# Patient Record
Sex: FEMALE | Race: WHITE | HISPANIC OR LATINO | Employment: OTHER | URBAN - METROPOLITAN AREA
[De-identification: names, ages, dates, MRNs, and addresses within clinical notes are randomized per-mention and may not be internally consistent; named-entity substitution may affect disease eponyms.]

---

## 2018-03-30 ENCOUNTER — CONVERSION ENCOUNTER (OUTPATIENT)
Dept: GENERAL RADIOLOGY | Facility: HOSPITAL | Age: 38
End: 2018-03-30

## 2019-04-22 ENCOUNTER — HOSPITAL ENCOUNTER (OUTPATIENT)
Dept: LAB | Facility: HOSPITAL | Age: 39
Discharge: HOME | End: 2019-04-22
Attending: PHYSICIAN ASSISTANT

## 2019-04-22 LAB
T4 FREE SERPL-MCNC: 1.1 NG/DL (ref 0.9–1.8)
TSH SERPL-ACNC: 38.45 M[IU]/L (ref 0.27–4.2)

## 2019-05-29 ENCOUNTER — OFFICE VISIT CONVERTED (OUTPATIENT)
Dept: SURGERY | Facility: CLINIC | Age: 39
End: 2019-05-29
Attending: SURGERY

## 2019-06-07 ENCOUNTER — HOSPITAL ENCOUNTER (OUTPATIENT)
Dept: PERIOP | Facility: HOSPITAL | Age: 39
Setting detail: HOSPITAL OUTPATIENT SURGERY
Discharge: HOME | End: 2019-06-07
Attending: SURGERY

## 2019-06-24 ENCOUNTER — OFFICE VISIT CONVERTED (OUTPATIENT)
Dept: SURGERY | Facility: CLINIC | Age: 39
End: 2019-06-24
Attending: SURGERY

## 2019-08-12 ENCOUNTER — OFFICE VISIT CONVERTED (OUTPATIENT)
Dept: SURGERY | Facility: CLINIC | Age: 39
End: 2019-08-12
Attending: SURGERY

## 2019-08-27 ENCOUNTER — HOSPITAL ENCOUNTER (OUTPATIENT)
Dept: GASTROENTEROLOGY | Facility: HOSPITAL | Age: 39
Setting detail: HOSPITAL OUTPATIENT SURGERY
Discharge: HOME | End: 2019-08-27
Attending: SURGERY

## 2021-02-04 ENCOUNTER — HOSPITAL ENCOUNTER (OUTPATIENT)
Dept: OTHER | Facility: HOSPITAL | Age: 41
Discharge: HOME | End: 2021-02-04
Attending: NURSE PRACTITIONER

## 2021-02-04 LAB
ALBUMIN SERPL-MCNC: 4.1 G/DL (ref 3.5–5)
ALBUMIN/GLOB SERPL: 1.3 {RATIO} (ref 1.4–2.6)
ALP SERPL-CCNC: 75 U/L (ref 42–98)
ALT SERPL-CCNC: 12 U/L (ref 10–40)
ANION GAP SERPL CALC-SCNC: 15 MMOL/L (ref 8–19)
AST SERPL-CCNC: 16 U/L (ref 15–50)
BASOPHILS # BLD AUTO: 0.06 10*3/UL (ref 0–0.2)
BASOPHILS NFR BLD AUTO: 0.8 % (ref 0–3)
BILIRUB SERPL-MCNC: 0.25 MG/DL (ref 0.2–1.3)
BUN SERPL-MCNC: 9 MG/DL (ref 5–25)
BUN/CREAT SERPL: 13 {RATIO} (ref 6–20)
CALCIUM SERPL-MCNC: 8.5 MG/DL (ref 8.7–10.4)
CHLORIDE SERPL-SCNC: 105 MMOL/L (ref 99–111)
CHOLEST SERPL-MCNC: 179 MG/DL (ref 107–200)
CHOLEST/HDLC SERPL: 3.2 {RATIO} (ref 3–6)
CONV ABS IMM GRAN: 0.03 10*3/UL (ref 0–0.2)
CONV CO2: 21 MMOL/L (ref 22–32)
CONV IMMATURE GRAN: 0.4 % (ref 0–1.8)
CONV TOTAL PROTEIN: 7.3 G/DL (ref 6.3–8.2)
CREAT UR-MCNC: 0.69 MG/DL (ref 0.5–0.9)
DEPRECATED RDW RBC AUTO: 40.9 FL (ref 36.4–46.3)
EOSINOPHIL # BLD AUTO: 0.24 10*3/UL (ref 0–0.7)
EOSINOPHIL # BLD AUTO: 3.1 % (ref 0–7)
ERYTHROCYTE [DISTWIDTH] IN BLOOD BY AUTOMATED COUNT: 14.6 % (ref 11.7–14.4)
GFR SERPLBLD BASED ON 1.73 SQ M-ARVRAT: >60 ML/MIN/{1.73_M2}
GLOBULIN UR ELPH-MCNC: 3.2 G/DL (ref 2–3.5)
GLUCOSE SERPL-MCNC: 81 MG/DL (ref 65–99)
HCT VFR BLD AUTO: 33.8 % (ref 37–47)
HDLC SERPL-MCNC: 56 MG/DL (ref 40–60)
HGB BLD-MCNC: 10.3 G/DL (ref 12–16)
LDLC SERPL CALC-MCNC: 104 MG/DL (ref 70–100)
LYMPHOCYTES # BLD AUTO: 2.23 10*3/UL (ref 1–5)
LYMPHOCYTES NFR BLD AUTO: 28.7 % (ref 20–45)
MCH RBC QN AUTO: 23.6 PG (ref 27–31)
MCHC RBC AUTO-ENTMCNC: 30.5 G/DL (ref 33–37)
MCV RBC AUTO: 77.3 FL (ref 81–99)
MONOCYTES # BLD AUTO: 0.52 10*3/UL (ref 0.2–1.2)
MONOCYTES NFR BLD AUTO: 6.7 % (ref 3–10)
NEUTROPHILS # BLD AUTO: 4.7 10*3/UL (ref 2–8)
NEUTROPHILS NFR BLD AUTO: 60.3 % (ref 30–85)
NRBC CBCN: 0 % (ref 0–0.7)
OSMOLALITY SERPL CALC.SUM OF ELEC: 282 MOSM/KG (ref 273–304)
PLATELET # BLD AUTO: 339 10*3/UL (ref 130–400)
PMV BLD AUTO: 10.7 FL (ref 9.4–12.3)
POTASSIUM SERPL-SCNC: 3.8 MMOL/L (ref 3.5–5.3)
RBC # BLD AUTO: 4.37 10*6/UL (ref 4.2–5.4)
SODIUM SERPL-SCNC: 137 MMOL/L (ref 135–147)
TRIGL SERPL-MCNC: 97 MG/DL (ref 40–150)
TSH SERPL-ACNC: 3.55 M[IU]/L (ref 0.27–4.2)
VLDLC SERPL-MCNC: 19 MG/DL (ref 5–37)
WBC # BLD AUTO: 7.78 10*3/UL (ref 4.8–10.8)

## 2021-03-24 ENCOUNTER — HOSPITAL ENCOUNTER (OUTPATIENT)
Dept: LAB | Facility: HOSPITAL | Age: 41
Discharge: HOME | End: 2021-03-24
Attending: INTERNAL MEDICINE

## 2021-03-24 LAB
APPEARANCE UR: CLEAR
BILIRUB UR QL: NEGATIVE
COLOR UR: YELLOW
CONV COLLECTION SOURCE (UA): NORMAL
CONV CREATININE URINE, RANDOM: 40 MG/DL (ref 10–300)
CONV UROBILINOGEN IN URINE BY AUTOMATED TEST STRIP: 0.2 {EHRLICHU}/DL (ref 0.1–1)
GLUCOSE UR QL: NEGATIVE MG/DL
HGB UR QL STRIP: NEGATIVE
KETONES UR QL STRIP: NEGATIVE MG/DL
LEUKOCYTE ESTERASE UR QL STRIP: NEGATIVE
NITRITE UR QL STRIP: NEGATIVE
PH UR STRIP.AUTO: 6 [PH] (ref 5–8)
PROT UR QL: NEGATIVE MG/DL
SP GR UR: 1.01 (ref 1–1.03)

## 2021-03-25 LAB — PROT UR-MCNC: <4 MG/DL

## 2021-05-15 VITALS — RESPIRATION RATE: 14 BRPM | BODY MASS INDEX: 35.26 KG/M2 | WEIGHT: 199 LBS | HEIGHT: 63 IN

## 2021-05-15 VITALS — BODY MASS INDEX: 34.91 KG/M2 | HEIGHT: 63 IN | WEIGHT: 197 LBS | RESPIRATION RATE: 16 BRPM

## 2021-05-24 ENCOUNTER — HOSPITAL ENCOUNTER (OUTPATIENT)
Dept: GENERAL RADIOLOGY | Facility: HOSPITAL | Age: 41
Discharge: HOME | End: 2021-05-24
Attending: OBSTETRICS & GYNECOLOGY

## 2021-06-09 RX ORDER — LEVOTHYROXINE SODIUM 175 UG/1
175 TABLET ORAL DAILY
COMMUNITY
End: 2022-03-03 | Stop reason: SDUPTHER

## 2021-06-13 PROBLEM — N93.9 ABNORMAL UTERINE BLEEDING: Status: ACTIVE | Noted: 2021-06-13

## 2021-06-14 ENCOUNTER — ANESTHESIA EVENT (OUTPATIENT)
Dept: PERIOP | Facility: HOSPITAL | Age: 41
End: 2021-06-14

## 2021-06-14 ENCOUNTER — HOSPITAL ENCOUNTER (OUTPATIENT)
Facility: HOSPITAL | Age: 41
Setting detail: HOSPITAL OUTPATIENT SURGERY
Discharge: HOME | End: 2021-06-14
Attending: OBSTETRICS & GYNECOLOGY | Admitting: OBSTETRICS & GYNECOLOGY

## 2021-06-14 ENCOUNTER — ANESTHESIA (OUTPATIENT)
Dept: PERIOP | Facility: HOSPITAL | Age: 41
End: 2021-06-14

## 2021-06-14 VITALS
SYSTOLIC BLOOD PRESSURE: 126 MMHG | OXYGEN SATURATION: 93 % | HEIGHT: 63 IN | WEIGHT: 203.26 LBS | TEMPERATURE: 97.3 F | RESPIRATION RATE: 18 BRPM | DIASTOLIC BLOOD PRESSURE: 84 MMHG | HEART RATE: 78 BPM | BODY MASS INDEX: 36.02 KG/M2

## 2021-06-14 DIAGNOSIS — N93.9 ABNORMAL UTERINE BLEEDING: Primary | ICD-10-CM

## 2021-06-14 LAB
BASOPHILS # BLD AUTO: 0.07 10*3/MM3 (ref 0–0.2)
BASOPHILS NFR BLD AUTO: 0.9 % (ref 0–1.5)
DEPRECATED RDW RBC AUTO: 45.2 FL (ref 37–54)
EOSINOPHIL # BLD AUTO: 0.34 10*3/MM3 (ref 0–0.4)
EOSINOPHIL NFR BLD AUTO: 4.2 % (ref 0.3–6.2)
ERYTHROCYTE [DISTWIDTH] IN BLOOD BY AUTOMATED COUNT: 16 % (ref 12.3–15.4)
HCG INTACT+B SERPL-ACNC: <0.5 MIU/ML
HCT VFR BLD AUTO: 32.8 % (ref 34–46.6)
HGB BLD-MCNC: 10.3 G/DL (ref 12–15.9)
IMM GRANULOCYTES # BLD AUTO: 0.04 10*3/MM3 (ref 0–0.05)
IMM GRANULOCYTES NFR BLD AUTO: 0.5 % (ref 0–0.5)
LYMPHOCYTES # BLD AUTO: 2.77 10*3/MM3 (ref 0.7–3.1)
LYMPHOCYTES NFR BLD AUTO: 34 % (ref 19.6–45.3)
MCH RBC QN AUTO: 24.6 PG (ref 26.6–33)
MCHC RBC AUTO-ENTMCNC: 31.4 G/DL (ref 31.5–35.7)
MCV RBC AUTO: 78.5 FL (ref 79–97)
MONOCYTES # BLD AUTO: 0.8 10*3/MM3 (ref 0.1–0.9)
MONOCYTES NFR BLD AUTO: 9.8 % (ref 5–12)
NEUTROPHILS NFR BLD AUTO: 4.13 10*3/MM3 (ref 1.7–7)
NEUTROPHILS NFR BLD AUTO: 50.6 % (ref 42.7–76)
NRBC BLD AUTO-RTO: 0 /100 WBC (ref 0–0.2)
PLATELET # BLD AUTO: 334 10*3/MM3 (ref 140–450)
PMV BLD AUTO: 10 FL (ref 6–12)
RBC # BLD AUTO: 4.18 10*6/MM3 (ref 3.77–5.28)
WBC # BLD AUTO: 8.15 10*3/MM3 (ref 3.4–10.8)

## 2021-06-14 PROCEDURE — 85025 COMPLETE CBC W/AUTO DIFF WBC: CPT | Performed by: OBSTETRICS & GYNECOLOGY

## 2021-06-14 PROCEDURE — 84702 CHORIONIC GONADOTROPIN TEST: CPT | Performed by: OBSTETRICS & GYNECOLOGY

## 2021-06-14 PROCEDURE — 25010000002 KETOROLAC TROMETHAMINE PER 15 MG: Performed by: NURSE ANESTHETIST, CERTIFIED REGISTERED

## 2021-06-14 PROCEDURE — 88305 TISSUE EXAM BY PATHOLOGIST: CPT | Performed by: OBSTETRICS & GYNECOLOGY

## 2021-06-14 PROCEDURE — 25010000002 MIDAZOLAM PER 1MG: Performed by: ANESTHESIOLOGY

## 2021-06-14 PROCEDURE — 25010000002 DEXAMETHASONE PER 1 MG: Performed by: NURSE ANESTHETIST, CERTIFIED REGISTERED

## 2021-06-14 PROCEDURE — 25010000002 PROPOFOL 10 MG/ML EMULSION: Performed by: NURSE ANESTHETIST, CERTIFIED REGISTERED

## 2021-06-14 PROCEDURE — 25010000002 FENTANYL CITRATE (PF) 50 MCG/ML SOLUTION: Performed by: NURSE ANESTHETIST, CERTIFIED REGISTERED

## 2021-06-14 PROCEDURE — 25010000002 ONDANSETRON PER 1 MG: Performed by: NURSE ANESTHETIST, CERTIFIED REGISTERED

## 2021-06-14 PROCEDURE — 25010000003 CEFAZOLIN IN DEXTROSE 2-4 GM/100ML-% SOLUTION: Performed by: OBSTETRICS & GYNECOLOGY

## 2021-06-14 RX ORDER — HYDROCODONE BITARTRATE AND ACETAMINOPHEN 5; 325 MG/1; MG/1
1 TABLET ORAL EVERY 6 HOURS PRN
Qty: 10 TABLET | Refills: 0 | Status: SHIPPED | OUTPATIENT
Start: 2021-06-14 | End: 2022-03-01

## 2021-06-14 RX ORDER — IBUPROFEN 600 MG/1
600 TABLET ORAL EVERY 6 HOURS PRN
Status: DISCONTINUED | OUTPATIENT
Start: 2021-06-14 | End: 2021-06-14 | Stop reason: HOSPADM

## 2021-06-14 RX ORDER — OXYCODONE HCL 5 MG/5 ML
5 SOLUTION, ORAL ORAL EVERY 4 HOURS PRN
Status: SHIPPED | OUTPATIENT
Start: 2021-06-14 | End: 2021-06-21

## 2021-06-14 RX ORDER — OXYCODONE HCL 5 MG/5 ML
10 SOLUTION, ORAL ORAL EVERY 4 HOURS PRN
Status: SHIPPED | OUTPATIENT
Start: 2021-06-14 | End: 2021-06-21

## 2021-06-14 RX ORDER — MEPERIDINE HYDROCHLORIDE 25 MG/ML
12.5 INJECTION INTRAMUSCULAR; INTRAVENOUS; SUBCUTANEOUS
Status: DISCONTINUED | OUTPATIENT
Start: 2021-06-14 | End: 2021-06-14 | Stop reason: HOSPADM

## 2021-06-14 RX ORDER — CEFAZOLIN SODIUM 2 G/100ML
2 INJECTION, SOLUTION INTRAVENOUS ONCE
Status: COMPLETED | OUTPATIENT
Start: 2021-06-14 | End: 2021-06-14

## 2021-06-14 RX ORDER — SODIUM CHLORIDE 0.9 % (FLUSH) 0.9 %
10 SYRINGE (ML) INJECTION AS NEEDED
Status: DISCONTINUED | OUTPATIENT
Start: 2021-06-14 | End: 2021-06-14 | Stop reason: HOSPADM

## 2021-06-14 RX ORDER — ONDANSETRON 2 MG/ML
INJECTION INTRAMUSCULAR; INTRAVENOUS AS NEEDED
Status: DISCONTINUED | OUTPATIENT
Start: 2021-06-14 | End: 2021-06-14 | Stop reason: SURG

## 2021-06-14 RX ORDER — IBUPROFEN 600 MG/1
600 TABLET ORAL EVERY 6 HOURS PRN
Qty: 60 TABLET | Refills: 1 | Status: SHIPPED | OUTPATIENT
Start: 2021-06-14 | End: 2022-03-01

## 2021-06-14 RX ORDER — ONDANSETRON 2 MG/ML
4 INJECTION INTRAMUSCULAR; INTRAVENOUS ONCE AS NEEDED
Status: DISCONTINUED | OUTPATIENT
Start: 2021-06-14 | End: 2021-06-14 | Stop reason: HOSPADM

## 2021-06-14 RX ORDER — PROPOFOL 10 MG/ML
VIAL (ML) INTRAVENOUS AS NEEDED
Status: DISCONTINUED | OUTPATIENT
Start: 2021-06-14 | End: 2021-06-14 | Stop reason: SURG

## 2021-06-14 RX ORDER — MAGNESIUM HYDROXIDE 1200 MG/15ML
LIQUID ORAL AS NEEDED
Status: DISCONTINUED | OUTPATIENT
Start: 2021-06-14 | End: 2021-06-14 | Stop reason: HOSPADM

## 2021-06-14 RX ORDER — PHENYLEPHRINE HCL IN 0.9% NACL 1 MG/10 ML
SYRINGE (ML) INTRAVENOUS AS NEEDED
Status: DISCONTINUED | OUTPATIENT
Start: 2021-06-14 | End: 2021-06-14 | Stop reason: SURG

## 2021-06-14 RX ORDER — SODIUM CHLORIDE, SODIUM LACTATE, POTASSIUM CHLORIDE, CALCIUM CHLORIDE 600; 310; 30; 20 MG/100ML; MG/100ML; MG/100ML; MG/100ML
9 INJECTION, SOLUTION INTRAVENOUS CONTINUOUS PRN
Status: DISCONTINUED | OUTPATIENT
Start: 2021-06-14 | End: 2021-06-14 | Stop reason: HOSPADM

## 2021-06-14 RX ORDER — MIDAZOLAM HYDROCHLORIDE 1 MG/ML
2 INJECTION INTRAMUSCULAR; INTRAVENOUS ONCE
Status: COMPLETED | OUTPATIENT
Start: 2021-06-14 | End: 2021-06-14

## 2021-06-14 RX ORDER — OXYCODONE HYDROCHLORIDE 5 MG/1
5 TABLET ORAL
Status: DISCONTINUED | OUTPATIENT
Start: 2021-06-14 | End: 2021-06-14 | Stop reason: HOSPADM

## 2021-06-14 RX ORDER — FENTANYL CITRATE 50 UG/ML
INJECTION, SOLUTION INTRAMUSCULAR; INTRAVENOUS AS NEEDED
Status: DISCONTINUED | OUTPATIENT
Start: 2021-06-14 | End: 2021-06-14 | Stop reason: SURG

## 2021-06-14 RX ORDER — SODIUM CHLORIDE, SODIUM LACTATE, POTASSIUM CHLORIDE, CALCIUM CHLORIDE 600; 310; 30; 20 MG/100ML; MG/100ML; MG/100ML; MG/100ML
100 INJECTION, SOLUTION INTRAVENOUS CONTINUOUS
Status: DISCONTINUED | OUTPATIENT
Start: 2021-06-14 | End: 2021-06-14 | Stop reason: HOSPADM

## 2021-06-14 RX ORDER — KETOROLAC TROMETHAMINE 30 MG/ML
INJECTION, SOLUTION INTRAMUSCULAR; INTRAVENOUS AS NEEDED
Status: DISCONTINUED | OUTPATIENT
Start: 2021-06-14 | End: 2021-06-14 | Stop reason: SURG

## 2021-06-14 RX ORDER — ACETAMINOPHEN 500 MG
1000 TABLET ORAL ONCE
Status: COMPLETED | OUTPATIENT
Start: 2021-06-14 | End: 2021-06-14

## 2021-06-14 RX ORDER — SODIUM CHLORIDE 0.9 % (FLUSH) 0.9 %
3 SYRINGE (ML) INJECTION EVERY 12 HOURS SCHEDULED
Status: DISCONTINUED | OUTPATIENT
Start: 2021-06-14 | End: 2021-06-14 | Stop reason: HOSPADM

## 2021-06-14 RX ORDER — BUPIVACAINE HYDROCHLORIDE AND EPINEPHRINE 5; 5 MG/ML; UG/ML
INJECTION, SOLUTION PERINEURAL AS NEEDED
Status: DISCONTINUED | OUTPATIENT
Start: 2021-06-14 | End: 2021-06-14 | Stop reason: HOSPADM

## 2021-06-14 RX ORDER — DEXMEDETOMIDINE HYDROCHLORIDE 100 UG/ML
INJECTION, SOLUTION INTRAVENOUS AS NEEDED
Status: DISCONTINUED | OUTPATIENT
Start: 2021-06-14 | End: 2021-06-14 | Stop reason: SURG

## 2021-06-14 RX ORDER — LIDOCAINE HYDROCHLORIDE 20 MG/ML
INJECTION, SOLUTION INFILTRATION; PERINEURAL AS NEEDED
Status: DISCONTINUED | OUTPATIENT
Start: 2021-06-14 | End: 2021-06-14 | Stop reason: SURG

## 2021-06-14 RX ORDER — SODIUM CHLORIDE 0.9 % (FLUSH) 0.9 %
10 SYRINGE (ML) INJECTION EVERY 12 HOURS SCHEDULED
Status: DISCONTINUED | OUTPATIENT
Start: 2021-06-14 | End: 2021-06-14 | Stop reason: HOSPADM

## 2021-06-14 RX ORDER — DEXAMETHASONE SODIUM PHOSPHATE 4 MG/ML
INJECTION, SOLUTION INTRA-ARTICULAR; INTRALESIONAL; INTRAMUSCULAR; INTRAVENOUS; SOFT TISSUE AS NEEDED
Status: DISCONTINUED | OUTPATIENT
Start: 2021-06-14 | End: 2021-06-14 | Stop reason: SURG

## 2021-06-14 RX ADMIN — ONDANSETRON 4 MG: 2 INJECTION INTRAMUSCULAR; INTRAVENOUS at 07:34

## 2021-06-14 RX ADMIN — OXYCODONE HYDROCHLORIDE 5 MG: 5 TABLET ORAL at 08:37

## 2021-06-14 RX ADMIN — DEXAMETHASONE SODIUM PHOSPHATE 8 MG: 4 INJECTION INTRA-ARTICULAR; INTRALESIONAL; INTRAMUSCULAR; INTRAVENOUS; SOFT TISSUE at 07:33

## 2021-06-14 RX ADMIN — LIDOCAINE HYDROCHLORIDE 100 MG: 20 INJECTION, SOLUTION INFILTRATION; PERINEURAL at 07:25

## 2021-06-14 RX ADMIN — DEXMEDETOMIDINE HYDROCHLORIDE 25 MCG: 100 INJECTION, SOLUTION INTRAVENOUS at 07:25

## 2021-06-14 RX ADMIN — ACETAMINOPHEN 1000 MG: 500 TABLET ORAL at 07:07

## 2021-06-14 RX ADMIN — PROPOFOL 200 MG: 10 INJECTION, EMULSION INTRAVENOUS at 07:25

## 2021-06-14 RX ADMIN — KETOROLAC TROMETHAMINE 30 MG: 30 INJECTION, SOLUTION INTRAMUSCULAR; INTRAVENOUS at 07:54

## 2021-06-14 RX ADMIN — SODIUM CHLORIDE, POTASSIUM CHLORIDE, SODIUM LACTATE AND CALCIUM CHLORIDE: 600; 310; 30; 20 INJECTION, SOLUTION INTRAVENOUS at 08:01

## 2021-06-14 RX ADMIN — FENTANYL CITRATE 50 MCG: 50 INJECTION INTRAMUSCULAR; INTRAVENOUS at 07:28

## 2021-06-14 RX ADMIN — FENTANYL CITRATE 50 MCG: 50 INJECTION INTRAMUSCULAR; INTRAVENOUS at 07:33

## 2021-06-14 RX ADMIN — SODIUM CHLORIDE, POTASSIUM CHLORIDE, SODIUM LACTATE AND CALCIUM CHLORIDE 100 ML/HR: 600; 310; 30; 20 INJECTION, SOLUTION INTRAVENOUS at 06:51

## 2021-06-14 RX ADMIN — Medication 100 MCG: at 07:42

## 2021-06-14 RX ADMIN — CEFAZOLIN SODIUM 2 G: 2 INJECTION, SOLUTION INTRAVENOUS at 07:22

## 2021-06-14 RX ADMIN — MIDAZOLAM HYDROCHLORIDE 2 MG: 1 INJECTION, SOLUTION INTRAMUSCULAR; INTRAVENOUS at 07:07

## 2021-06-14 NOTE — H&P
[facility]   HISTORY AND PHYSICAL    Patient Name: Saray Vang  : 1980  MRN: [id number]  Primary Care Physician:  Emma, No Known  Date of admission: 2021    Subjective   Subjective     Chief Complaint: Here for surgery    HPI:    Saray Vang is a 40 y.o. female who presents for hysteroscopy, D&C and endometrial ablation for the treatment of abnormal uterine bleeding. She has heavy and frequent menses with clotting and flooding. Her menses has been refractory to medical therapy and she now desires to proceed with surgical therapy.    Review of Systems   All systems were reviewed and negative except for: Heavy mesnes. Frequent menses. Fatigue    Personal History     Past Medical History:   Diagnosis Date   • Abnormal uterine bleeding (AUB)    • Disease of thyroid gland     total thyroidectomy       Past Surgical History:   Procedure Laterality Date   • HEMORRHOIDECTOMY     • LAPAROSCOPIC TUBAL LIGATION     • TOTAL THYROIDECTOMY            section    Family History: Heart disease, HLD, HTN, DM, breast cancer, Thyroid cancer    Social History:  reports that she has never smoked. She does not have any smokeless tobacco history on file. She reports that she does not drink alcohol and does not use drugs.    Home Medications:  levothyroxine      Allergies:  No Known Allergies    Objective   Objective     Vitals:   Temp:  [98.5 °F (36.9 °C)] 98.5 °F (36.9 °C)  Heart Rate:  [81] 81  Resp:  [20] 20  BP: (116)/(78) 116/78BP 117/82 Wt 203    Physical Exam    Constitutional: Awake, alert   Eyes: PERRLA, sclerae anicteric, no conjunctival injection   HENT: NCAT, mucous membranes moist   Neck: Supple, thyroid is absent, no lymphadenopathy, trachea midline   Respiratory: Clear to auscultation bilaterally, nonlabored respirations    Cardiovascular: RRR, no murmurs, rubs, or gallops, palpable pedal pulses bilaterally   Gastrointestinal: Positive bowel sounds, soft, nontender, nondistended               Genitourinary: Normal external genitalia, vagina and cervix. Uterus 10cm anteverted, no palpable masses or adnexal masses   Musculoskeletal: No bilateral ankle edema, no clubbing or cyanosis to extremities   Psychiatric: Appropriate affect, cooperative   Neurologic: Oriented x 3, strength symmetric in all extremities, speech clear   Skin: No rashes     Result Review    Result Review:  I have personally reviewed the results from the time of this admission to 6/14/2021 07:08 EDT and agree with these findings:  []  Laboratory  []  Microbiology  [x]  Radiology  []  EKG/Telemetry   []  Cardiology/Vascular   [x]  Pathology  [x]  Old records  []  Other:      Assessment/Plan   Assessment / Plan       Active Hospital Problems:  Active Hospital Problems    Diagnosis    • **Abnormal uterine bleeding      Plan: The patient has failed medical therapy, and declines further medical therapy. She desires hysteroscopy dilation and curettage, endometrial ablation. She declines hysterectomy. The risks, benefits and alternatives of the procedure have been reviewed with the patient and she desires to proceed.      DVT prophylaxis:  No DVT prophylaxis order currently exists.    CODE STATUS:       Admission Status:  I believe this patient meets outpatient status.    Electronically signed by Praful Topete MD, 06/13/21, 8:57 PM EDT.

## 2021-06-14 NOTE — ANESTHESIA PREPROCEDURE EVALUATION
Anesthesia Evaluation     Patient summary reviewed and Nursing notes reviewed   no history of anesthetic complications:  NPO Solid Status: > 8 hours  NPO Liquid Status: > 2 hours           Airway   Mallampati: II  TM distance: >3 FB  Neck ROM: full  No difficulty expected  Dental - normal exam     Pulmonary - negative pulmonary ROS and normal exam   Cardiovascular - negative cardio ROS and normal exam  Exercise tolerance: excellent (>7 METS)        Neuro/Psych- negative ROS  GI/Hepatic/Renal/Endo - negative ROS     Musculoskeletal (-) negative ROS    Abdominal    Substance History - negative use     OB/GYN negative ob/gyn ROS         Other - negative ROS       Blood dyscrasia:     ROS/Med Hx Other: Pt hypothyroid pt takes replacement thyroid for replacement    Pt took synthroid this am with a sip of water                  Anesthesia Plan    ASA 2     general and MAC     intravenous induction     Anesthetic plan, all risks, benefits, and alternatives have been provided, discussed and informed consent has been obtained with: patient and spouse/significant other.

## 2021-06-14 NOTE — DISCHARGE INSTRUCTIONS
DISCHARGE INSTRUCTIONS  GYNECOLOGICAL  PROCEDURES      ? For your surgery you had:  ? General anesthesia (you may have a sore throat for the first 24 hours)  ?   ?   ?   ? You received a medicated patch for nausea prevention today (behind your ear). It is recommended that you remove it 24-48 hours post-operatively. It must be removed within 72 hours.  ? You may experience dizziness, drowsiness, or lightheadedness for several hours following surgery.  ? Do not stay alone today or tonight.  ? Limit your activity for 24 hours.  ? Resume your diet slowly.  Follow any special dietary instructions you may have been given by your doctor.  ? You should not drive or operate machinery, drink alcohol, or sign legally binding documents for 24 hours or while you are taking pain medication.  Last dose of pain medication was given at:  .  NOTIFY YOUR DOCTOR IF YOU EXPERIENCE ANY OF THE FOLLOWING:  ? Temperature greater than 101 degrees Fahrenheit  ? Shaking Chills  ? Redness or excessive drainage from incision  ? Nausea, vomiting and/or pain that is not controlled by prescribed medications  ? Increase in bleeding or bleeding that is excessive  ? Unable to urinate in 6 hours after surgery  ? If unable to reach your doctor, please go to the closest Emergency Room [] Remove dressing in:     [] You may resume intercourse and the use of tampons as your physician has instructed you.  [] Nothing in the vagina for 2 weeks to include intercourse, douches, or tampons.  [] Vaginal bleeding may be expected for several days with flow decreasing with time and never any heavier than a normal   period.  ? If you have an ablation, vaginal discharge is expected after bleeding stops.   ? If you have foul smelling discharge, notify your physician.  ? Medications per physician instructions as indicated on Discharge Medication Information Sheet.  You should see   for follow-up care   on   .  Phone number:      SPECIAL INSTRUCTIONS:

## 2021-06-14 NOTE — ANESTHESIA POSTPROCEDURE EVALUATION
Patient: April Taj    Procedure Summary     Date: 06/14/21 Room / Location: [facility] OR  / [facility]    Anesthesia Start: 0722 Anesthesia Stop: 0807    Procedure: DILATATION AND CURETTAGE, HYSTEROSCOPY, NOVASURE ENDOMETRIAL ABLATION (N/A Vagina) Diagnosis: (ABNORMAL UTERINE BLEEDING)    Surgeons: Praful Topete MD Provider: Andre Samayoa MD    Anesthesia Type: general, MAC ASA Status: 2          Anesthesia Type: general, MAC    Vitals  Vitals Value Taken Time   BP 94/54 06/14/21 0812   Temp     Pulse 84 06/14/21 0813   Resp     SpO2 98 % 06/14/21 0813   Vitals shown include unvalidated device data.        Post Anesthesia Care and Evaluation    Patient location during evaluation: bedside  Patient participation: complete - patient participated  Level of consciousness: awake  Pain score: 1  Pain management: adequate  Airway patency: patent  Anesthetic complications: No anesthetic complications  PONV Status: none  Cardiovascular status: acceptable and stable  Respiratory status: acceptable and room air  Hydration status: acceptable

## 2021-06-14 NOTE — OP NOTE
DILATATION AND CURETTAGE HYSTEROSCOPY NOVASURE ENDOMETRIAL ABLATION  Procedure Report    Patient Name:  Saray Vang  YOB: 1980    Date of Surgery:  6/14/2021     Pre-op Diagnosis:   ABNORMAL UTERINE BLEEDING       Post-Op Diagnosis Codes:  Same    Procedure(s):  DILATATION AND CURETTAGE, HYSTEROSCOPY, NOVASURE ENDOMETRIAL ABLATION    Staff:  Surgeon(s):  Praful Topete MD      Anesthesia: General    Estimated Blood Loss: 20 ml      Specimen:          Specimens     ID Source Type Tests Collected By Collected At Frozen?    A Endometrial Curettings Tissue · TISSUE PATHOLOGY EXAM   Praful Topete MD 6/14/21 0748     This specimen was not marked as sent.              Findings: Normal external genitalia, vagina, and cervix.  The uterine cavity was normal without submucous myoma, congenital anomaly, or endometrial polyp.  Post ablation hysteroscopy revealed good global ablation without evidence of unintended uterine injury or perforation.    Complications: None    Description of Procedure: After reviewing the informed consent, including the risks, benefits and alternatives to the procedure, the patient expressed her understanding and wished to proceed.  She was taken to the operating room with an I.V. in place and running.  She was placed on the operating table in the dorsal supine position. She was given a general anesthetic and an LMA was placed.  She was re-positioned with her arms out to the side, and her legs in candycane stirrups.  We took care in positioning both the arms and the legs, padding any potential pressure points.  The patient was prepped and draped in the usual sterile fashion.  An in and out catheterization was performed to drain the patient's bladder.  A surgical time-out was performed.  I inserted a Tinsley retractor into the posterior vagina, and a Ophir retractor into the anterior vagina. I grasped the cervix with the single tooth tenaculum.  I carried out my  paracervical block with 10 ml of 0.5% Marcaine with Epinephrine. I introduced a 5 mm 30 degree diagnostic hysteroscope, with normal saline as my distension media, through the external cervical os and into the uterine cavity without difficulty.      I surveyed the uterine cavity.  The uterine cavity appeared normal, without evidence of endometrial polyp, submucous myoma, or congenital anomaly.  The hysteroscope was removed.  The uterus was sounded to 10 cm.  Using a Hegar dilator, the cervix were estimated to be 5.5 cm.  The cavity length thus, was 4.5 cm.  The cervix was dilated to 6 mm.  I performed a systematic sharp curettage of the entire endometrium. This specimen was passed off as endometrial curettings.  I re-introduced the hysteroscope. There was a good curettage of the entire endometrium, and no evidence of any uterine injury. The hysteroscope was removed.  The NovaSure ablation device was called for.  The device array was deployed, and I had a uterine with greater than 2.5 cm.  The array was retracted back into the sleeve.  The NovaSure device was inserted through the external os, and gently to the fundus of the uterus, using a bow and arrow technique.  The array was then again deployed, and the device seated, per the 's recommendations.  The uterine width was 4.5 cm.  The power was determined by the device to be 111 W.  The cervical collar was advanced against the cervix.  The cavity assessment was carried out, and passed.  The device was enabled, and the ablation initiated.  The ablation cycle carried out for 1 minute 26 seconds.  At the end of the ablation cycle, the array was retracted back into the NovaSure device a sleeve, using the bow and arrow technique.  The hysteroscope was reintroduced, and the cavity surveyed.  There was a good global ablation, without unintended injury or uterine perforation.  The hysteroscope was removed.  I removed the tenaculum.  The tenaculum sites were  hemostatic. The other instruments were removed.  Normal saline was used as a uterine distention media, and there was no significant fluid deficit.    The patient was taken out of lithotomy position, awoken from her anesthesia and then taken to the recovery room in satisfactory condition.  All counts were correct x 2.  The patient received Kefzol as her preoperative antibiotics. The patient will be discharged home when she meets discharge criteria. She will follow-up with me in 2-3 weeks.  She was instructed to call the office for temperature than greater than 100 degrees Fahrenheit, shortness of breath or chest pain, excessive nausea or vomiting with inability to tolerate oral intake, pain that is worsening despite current pain medications, heavy vaginal bleeding, or other concerns.          Praful Topete MD     Date: 6/14/2021  Time: 08:05 EDT

## 2021-06-15 LAB
CYTO UR: NORMAL
LAB AP CASE REPORT: NORMAL
LAB AP CLINICAL INFORMATION: NORMAL
PATH REPORT.FINAL DX SPEC: NORMAL
PATH REPORT.GROSS SPEC: NORMAL

## 2022-03-01 ENCOUNTER — OFFICE VISIT (OUTPATIENT)
Dept: FAMILY MEDICINE CLINIC | Facility: CLINIC | Age: 42
End: 2022-03-01

## 2022-03-01 ENCOUNTER — LAB (OUTPATIENT)
Dept: LAB | Facility: HOSPITAL | Age: 42
End: 2022-03-01

## 2022-03-01 VITALS
DIASTOLIC BLOOD PRESSURE: 87 MMHG | BODY MASS INDEX: 37.21 KG/M2 | OXYGEN SATURATION: 96 % | SYSTOLIC BLOOD PRESSURE: 131 MMHG | HEIGHT: 63 IN | RESPIRATION RATE: 20 BRPM | TEMPERATURE: 97.1 F | WEIGHT: 210 LBS | HEART RATE: 81 BPM

## 2022-03-01 DIAGNOSIS — E78.2 MIXED HYPERLIPIDEMIA: ICD-10-CM

## 2022-03-01 DIAGNOSIS — E03.9 ACQUIRED HYPOTHYROIDISM: ICD-10-CM

## 2022-03-01 DIAGNOSIS — J30.2 SEASONAL ALLERGIES: Chronic | ICD-10-CM

## 2022-03-01 DIAGNOSIS — E66.09 CLASS 2 OBESITY DUE TO EXCESS CALORIES WITHOUT SERIOUS COMORBIDITY WITH BODY MASS INDEX (BMI) OF 37.0 TO 37.9 IN ADULT: Chronic | ICD-10-CM

## 2022-03-01 DIAGNOSIS — D64.9 ANEMIA, UNSPECIFIED TYPE: ICD-10-CM

## 2022-03-01 DIAGNOSIS — I10 PRIMARY HYPERTENSION: Chronic | ICD-10-CM

## 2022-03-01 DIAGNOSIS — Z00.00 ANNUAL PHYSICAL EXAM: Primary | ICD-10-CM

## 2022-03-01 DIAGNOSIS — Z00.00 ANNUAL PHYSICAL EXAM: ICD-10-CM

## 2022-03-01 PROBLEM — F32.9 MAJOR DEPRESSION, SINGLE EPISODE: Status: ACTIVE | Noted: 2022-03-01

## 2022-03-01 PROBLEM — K59.01 SLOW TRANSIT CONSTIPATION: Status: ACTIVE | Noted: 2022-03-01

## 2022-03-01 PROBLEM — G93.32 CHRONIC FATIGUE SYNDROME: Status: ACTIVE | Noted: 2022-03-01

## 2022-03-01 PROBLEM — D50.8 IRON DEFICIENCY ANEMIA SECONDARY TO INADEQUATE DIETARY IRON INTAKE: Chronic | Status: ACTIVE | Noted: 2022-03-01

## 2022-03-01 PROBLEM — Z81.0 FAMILY HISTORY OF INTELLECTUAL DISABILITY: Status: ACTIVE | Noted: 2022-03-01

## 2022-03-01 PROBLEM — Z82.79 FAMILY HISTORY OF NEURAL TUBE DEFECT: Chronic | Status: ACTIVE | Noted: 2022-03-01

## 2022-03-01 PROBLEM — Z81.0 FAMILY HISTORY OF INTELLECTUAL DISABILITY: Chronic | Status: ACTIVE | Noted: 2022-03-01

## 2022-03-01 PROBLEM — F32.9 MAJOR DEPRESSION, SINGLE EPISODE: Status: RESOLVED | Noted: 2022-03-01 | Resolved: 2022-03-01

## 2022-03-01 PROBLEM — E66.812 CLASS 2 OBESITY DUE TO EXCESS CALORIES WITHOUT SERIOUS COMORBIDITY WITH BODY MASS INDEX (BMI) OF 37.0 TO 37.9 IN ADULT: Chronic | Status: ACTIVE | Noted: 2022-03-01

## 2022-03-01 PROBLEM — Z82.79 FAMILY HISTORY OF NEURAL TUBE DEFECT: Status: ACTIVE | Noted: 2022-03-01

## 2022-03-01 PROBLEM — G93.32 CHRONIC FATIGUE SYNDROME: Chronic | Status: ACTIVE | Noted: 2022-03-01

## 2022-03-01 PROBLEM — D50.8 IRON DEFICIENCY ANEMIA SECONDARY TO INADEQUATE DIETARY IRON INTAKE: Status: ACTIVE | Noted: 2022-03-01

## 2022-03-01 PROBLEM — K59.01 SLOW TRANSIT CONSTIPATION: Chronic | Status: ACTIVE | Noted: 2022-03-01

## 2022-03-01 LAB
ALBUMIN SERPL-MCNC: 4.4 G/DL (ref 3.5–5.2)
ALBUMIN/GLOB SERPL: 1.3 G/DL
ALP SERPL-CCNC: 75 U/L (ref 39–117)
ALT SERPL W P-5'-P-CCNC: 29 U/L (ref 1–33)
ANION GAP SERPL CALCULATED.3IONS-SCNC: 10.7 MMOL/L (ref 5–15)
AST SERPL-CCNC: 21 U/L (ref 1–32)
BASOPHILS # BLD AUTO: 0.06 10*3/MM3 (ref 0–0.2)
BASOPHILS NFR BLD AUTO: 0.8 % (ref 0–1.5)
BILIRUB SERPL-MCNC: 0.3 MG/DL (ref 0–1.2)
BUN SERPL-MCNC: 13 MG/DL (ref 6–20)
BUN/CREAT SERPL: 13 (ref 7–25)
CALCIUM SPEC-SCNC: 9.5 MG/DL (ref 8.6–10.5)
CHLORIDE SERPL-SCNC: 104 MMOL/L (ref 98–107)
CHOLEST SERPL-MCNC: 245 MG/DL (ref 0–200)
CO2 SERPL-SCNC: 25.3 MMOL/L (ref 22–29)
CREAT SERPL-MCNC: 1 MG/DL (ref 0.57–1)
DEPRECATED RDW RBC AUTO: 40.6 FL (ref 37–54)
EGFRCR SERPLBLD CKD-EPI 2021: 73.2 ML/MIN/1.73
EOSINOPHIL # BLD AUTO: 0.37 10*3/MM3 (ref 0–0.4)
EOSINOPHIL NFR BLD AUTO: 5 % (ref 0.3–6.2)
ERYTHROCYTE [DISTWIDTH] IN BLOOD BY AUTOMATED COUNT: 12.5 % (ref 12.3–15.4)
FERRITIN SERPL-MCNC: 34.7 NG/ML (ref 13–150)
FOLATE SERPL-MCNC: 12.3 NG/ML (ref 4.78–24.2)
GLOBULIN UR ELPH-MCNC: 3.4 GM/DL
GLUCOSE SERPL-MCNC: 97 MG/DL (ref 65–99)
HCT VFR BLD AUTO: 42 % (ref 34–46.6)
HDLC SERPL-MCNC: 62 MG/DL (ref 40–60)
HGB BLD-MCNC: 14.2 G/DL (ref 12–15.9)
IMM GRANULOCYTES # BLD AUTO: 0.05 10*3/MM3 (ref 0–0.05)
IMM GRANULOCYTES NFR BLD AUTO: 0.7 % (ref 0–0.5)
IRON 24H UR-MRATE: 96 MCG/DL (ref 37–145)
IRON SATN MFR SERPL: 22 % (ref 20–50)
LDLC SERPL CALC-MCNC: 165 MG/DL (ref 0–100)
LDLC/HDLC SERPL: 2.63 {RATIO}
LYMPHOCYTES # BLD AUTO: 1.95 10*3/MM3 (ref 0.7–3.1)
LYMPHOCYTES NFR BLD AUTO: 26.2 % (ref 19.6–45.3)
MCH RBC QN AUTO: 29.9 PG (ref 26.6–33)
MCHC RBC AUTO-ENTMCNC: 33.8 G/DL (ref 31.5–35.7)
MCV RBC AUTO: 88.4 FL (ref 79–97)
MONOCYTES # BLD AUTO: 0.45 10*3/MM3 (ref 0.1–0.9)
MONOCYTES NFR BLD AUTO: 6 % (ref 5–12)
NEUTROPHILS NFR BLD AUTO: 4.57 10*3/MM3 (ref 1.7–7)
NEUTROPHILS NFR BLD AUTO: 61.3 % (ref 42.7–76)
NRBC BLD AUTO-RTO: 0 /100 WBC (ref 0–0.2)
PLATELET # BLD AUTO: 359 10*3/MM3 (ref 140–450)
PMV BLD AUTO: 10.5 FL (ref 6–12)
POTASSIUM SERPL-SCNC: 4.5 MMOL/L (ref 3.5–5.2)
PROT SERPL-MCNC: 7.8 G/DL (ref 6–8.5)
RBC # BLD AUTO: 4.75 10*6/MM3 (ref 3.77–5.28)
SODIUM SERPL-SCNC: 140 MMOL/L (ref 136–145)
T4 FREE SERPL-MCNC: 1.28 NG/DL (ref 0.93–1.7)
TIBC SERPL-MCNC: 437 MCG/DL (ref 298–536)
TRANSFERRIN SERPL-MCNC: 293 MG/DL (ref 200–360)
TRIGL SERPL-MCNC: 101 MG/DL (ref 0–150)
TSH SERPL DL<=0.05 MIU/L-ACNC: 28 UIU/ML (ref 0.27–4.2)
VIT B12 BLD-MCNC: 545 PG/ML (ref 211–946)
VLDLC SERPL-MCNC: 18 MG/DL (ref 5–40)
WBC NRBC COR # BLD: 7.45 10*3/MM3 (ref 3.4–10.8)

## 2022-03-01 PROCEDURE — 80053 COMPREHEN METABOLIC PANEL: CPT

## 2022-03-01 PROCEDURE — 99214 OFFICE O/P EST MOD 30 MIN: CPT | Performed by: FAMILY MEDICINE

## 2022-03-01 PROCEDURE — 82746 ASSAY OF FOLIC ACID SERUM: CPT

## 2022-03-01 PROCEDURE — 83540 ASSAY OF IRON: CPT

## 2022-03-01 PROCEDURE — 82728 ASSAY OF FERRITIN: CPT

## 2022-03-01 PROCEDURE — 84439 ASSAY OF FREE THYROXINE: CPT

## 2022-03-01 PROCEDURE — 80061 LIPID PANEL: CPT

## 2022-03-01 PROCEDURE — 36415 COLL VENOUS BLD VENIPUNCTURE: CPT

## 2022-03-01 PROCEDURE — 85025 COMPLETE CBC W/AUTO DIFF WBC: CPT

## 2022-03-01 PROCEDURE — 84466 ASSAY OF TRANSFERRIN: CPT

## 2022-03-01 PROCEDURE — 99396 PREV VISIT EST AGE 40-64: CPT | Performed by: FAMILY MEDICINE

## 2022-03-01 PROCEDURE — 82607 VITAMIN B-12: CPT

## 2022-03-01 PROCEDURE — 84443 ASSAY THYROID STIM HORMONE: CPT

## 2022-03-01 RX ORDER — CETIRIZINE HYDROCHLORIDE 10 MG/1
10 TABLET ORAL DAILY
Qty: 90 TABLET | Refills: 1 | Status: SHIPPED | OUTPATIENT
Start: 2022-03-01 | End: 2023-04-04 | Stop reason: SDUPTHER

## 2022-03-01 RX ORDER — FERROUS SULFATE 220 (44)/5
325 ELIXIR ORAL DAILY
COMMUNITY
End: 2023-01-24

## 2022-03-01 NOTE — ASSESSMENT & PLAN NOTE
Patient's (Body mass index is 37.21 kg/m².) indicates that they are morbidly obese (BMI > 40 or > 35 with obesity - related health condition) with health conditions that include hypertension and dyslipidemias . Weight is newly identified. BMI is is above average; BMI management plan is completed. We discussed low calorie, low carb based diet program, portion control and increasing exercise.

## 2022-03-01 NOTE — ASSESSMENT & PLAN NOTE
The patient was given lab orders today for CBC as well as iron profile and B12 and folate levels all to be manage according to findings.

## 2022-03-01 NOTE — ASSESSMENT & PLAN NOTE
The patient is currently taking 175 mcg of Synthroid daily.  Should be continued on this dosage and lab orders were placed today for thyroid profile to be managed according findings.  We will follow up with her hypothyroidism in 6 months.

## 2022-03-01 NOTE — PROGRESS NOTES
"Chief Complaint  Establish Care and Hypothyroidism    Subjective          Saray Vang presents to [facility]  She is here today to stipes relations as new patient.  Her past PCP was Dr Hawkins. She is  and has three children.  She has one of her daughters with her today who has leukemia is currently undergoing chemotherapy.  She has a FH of thorid and breast cancer.    She has gained wt over the past few months. She also is having some sinus and ear pressure.      The patient has no other complaints today and denies chest pain, shortness of breath, weakness, numbness, nausea, vomiting, diarrhea, dizziness or syncopal event.        Objective   Vital Signs:   /87 (BP Location: Left arm, Patient Position: Sitting)   Pulse 81   Temp 97.1 °F (36.2 °C)   Resp 20   Ht 160 cm (62.99\")   Wt 95.3 kg (210 lb)   SpO2 96%   BMI 37.21 kg/m²     Physical Exam  Vitals reviewed.   Constitutional:       Appearance: Normal appearance. She is well-developed. She is obese.   HENT:      Head: Normocephalic and atraumatic.      Right Ear: External ear normal.      Left Ear: External ear normal.      Mouth/Throat:      Pharynx: No oropharyngeal exudate.   Eyes:      Conjunctiva/sclera: Conjunctivae normal.      Pupils: Pupils are equal, round, and reactive to light.   Neck:      Vascular: No carotid bruit.   Cardiovascular:      Rate and Rhythm: Normal rate and regular rhythm.      Heart sounds: No murmur heard.  No friction rub. No gallop.    Pulmonary:      Effort: Pulmonary effort is normal.      Breath sounds: Normal breath sounds. No wheezing or rhonchi.   Abdominal:      General: There is no distension.   Skin:     General: Skin is warm and dry.   Neurological:      Mental Status: She is alert and oriented to person, place, and time.      Cranial Nerves: No cranial nerve deficit.      Motor: No weakness.   Psychiatric:         Mood and Affect: Mood and affect normal.         " Behavior: Behavior normal.         Thought Content: Thought content normal.         Judgment: Judgment normal.        Result Review :         CBC    CBC 6/14/21   WBC 8.15   RBC 4.18   Hemoglobin 10.3 (A)   Hematocrit 32.8 (A)   MCV 78.5 (A)   MCH 24.6 (A)   MCHC 31.4 (A)   RDW 16.0 (A)   Platelets 334   (A) Abnormal value                              Assessment and Plan    Diagnoses and all orders for this visit:    1. Annual physical exam (Primary)  Assessment & Plan:  The patient was encouraged to lose weight.  She is current on mammograms.  She was encouraged to wear seatbelt not text and drive.  Screening labs ordered today to be managed according to findings.    Orders:  -     Comprehensive Metabolic Panel; Future  -     CBC & Differential; Future    2. Acquired hypothyroidism  Assessment & Plan:  The patient is currently taking 175 mcg of Synthroid daily.  Should be continued on this dosage and lab orders were placed today for thyroid profile to be managed according findings.  We will follow up with her hypothyroidism in 6 months.    Orders:  -     TSH+Free T4; Future    3. Anemia, unspecified type  Assessment & Plan:  The patient was given lab orders today for CBC as well as iron profile and B12 and folate levels all to be manage according to findings.    Orders:  -     Iron and TIBC; Future  -     Ferritin; Future  -     Vitamin B12; Future  -     Folate; Future    4. Mixed hyperlipidemia  Assessment & Plan:  Lipid abnormalities are newly identified.  Nutritional counseling was provided.  Lipids will be reassessed in 6 months.    Orders:  -     Lipid Panel; Future    5. Primary hypertension  Assessment & Plan:  Hypertension is newly identified.  Continue current treatment regimen.  Dietary sodium restriction.  Weight loss.  Blood pressure will be reassessed at the next regular appointment.      6. Class 2 obesity due to excess calories without serious comorbidity with body mass index (BMI) of 37.0 to 37.9  in adult  Assessment & Plan:  Patient's (Body mass index is 37.21 kg/m².) indicates that they are morbidly obese (BMI > 40 or > 35 with obesity - related health condition) with health conditions that include hypertension and dyslipidemias . Weight is newly identified. BMI is is above average; BMI management plan is completed. We discussed low calorie, low carb based diet program, portion control and increasing exercise.       Other orders  -     cetirizine (zyrTEC) 10 MG tablet; Take 1 tablet by mouth Daily.  Dispense: 90 tablet; Refill: 1      Follow Up   Return in about 6 months (around 9/1/2022).  Patient was given instructions and counseling regarding her condition or for health maintenance advice. Please see specific information pulled into the AVS if appropriate.

## 2022-03-01 NOTE — ASSESSMENT & PLAN NOTE
Hypertension is newly identified.  Continue current treatment regimen.  Dietary sodium restriction.  Weight loss.  Blood pressure will be reassessed at the next regular appointment.

## 2022-03-01 NOTE — ASSESSMENT & PLAN NOTE
The patient was encouraged to lose weight.  She is current on mammograms.  She was encouraged to wear seatbelt not text and drive.  Screening labs ordered today to be managed according to findings.

## 2022-03-03 ENCOUNTER — PATIENT ROUNDING (BHMG ONLY) (OUTPATIENT)
Dept: FAMILY MEDICINE CLINIC | Facility: CLINIC | Age: 42
End: 2022-03-03

## 2022-03-03 RX ORDER — LEVOTHYROXINE SODIUM 175 UG/1
175 TABLET ORAL DAILY
Qty: 90 TABLET | Refills: 0 | Status: SHIPPED | OUTPATIENT
Start: 2022-03-03 | End: 2022-04-07

## 2022-03-03 RX ORDER — LEVOTHYROXINE SODIUM 175 UG/1
175 TABLET ORAL DAILY
Qty: 90 TABLET | Refills: 0 | Status: SHIPPED | OUTPATIENT
Start: 2022-03-03 | End: 2022-03-03 | Stop reason: SDUPTHER

## 2022-03-03 NOTE — PROGRESS NOTES
March 3, 2022    Hello, may I speak with Saray Vang?    My name is Narcisa     I am  with [address] [address] [phone].    Before we get started may I verify your date of birth? 1980    I am calling to officially welcome you to our practice and ask about your recent visit. Is this a good time to talk? yes    Tell me about your visit with us. What things went well?  everything was good and went over all concerns        We're always looking for ways to make our patients' experiences even better. Do you have recommendations on ways we may improve?  no    Overall were you satisfied with your first visit to our practice? yes       I appreciate you taking the time to speak with me today. Is there anything else I can do for you? no      Thank you, and have a great day.

## 2022-04-07 RX ORDER — LEVOTHYROXINE SODIUM 175 UG/1
175 TABLET ORAL DAILY
Qty: 90 TABLET | Refills: 0 | Status: SHIPPED | OUTPATIENT
Start: 2022-04-07 | End: 2022-08-25 | Stop reason: SDUPTHER

## 2022-04-07 RX ORDER — LEVOTHYROXINE SODIUM 175 UG/1
TABLET ORAL
Qty: 90 TABLET | Refills: 0 | Status: SHIPPED | OUTPATIENT
Start: 2022-04-07 | End: 2022-04-07 | Stop reason: SDUPTHER

## 2022-04-07 NOTE — TELEPHONE ENCOUNTER
Caller: Taj, April    Relationship: Self    Best call back number: [phone]    Requested Prescriptions:   Requested Prescriptions     Pending Prescriptions Disp Refills   • levothyroxine (SYNTHROID, LEVOTHROID) 175 MCG tablet 90 tablet 0        [address] [facility] [phone] [facility] [phone] FX     Additional details provided by patient:          THE PATIENT SAID SHE DON'T HAVE ANY REFILLS ON THIS MEDICATION. THE PATIENT SAID SHE USUALLY TAKES SYNTHROID AND NOT THE GENERIC. SHE IS WANTING PCP TO SEE IF PCP WOULD PRESCRIBE HER THE SYNTHROID.         Does the patient have less than a 3 day supply:  [] Yes  [x] No    Mir Conde Rep   04/07/22 15:50 EDT

## 2022-06-20 ENCOUNTER — OFFICE VISIT (OUTPATIENT)
Dept: FAMILY MEDICINE CLINIC | Facility: CLINIC | Age: 42
End: 2022-06-20

## 2022-06-20 VITALS
DIASTOLIC BLOOD PRESSURE: 85 MMHG | HEART RATE: 72 BPM | TEMPERATURE: 97.2 F | HEIGHT: 63 IN | BODY MASS INDEX: 36.11 KG/M2 | SYSTOLIC BLOOD PRESSURE: 135 MMHG | WEIGHT: 203.8 LBS | OXYGEN SATURATION: 97 % | RESPIRATION RATE: 18 BRPM

## 2022-06-20 DIAGNOSIS — B96.89 BACTERIAL URI: Primary | ICD-10-CM

## 2022-06-20 DIAGNOSIS — E03.9 ACQUIRED HYPOTHYROIDISM: ICD-10-CM

## 2022-06-20 DIAGNOSIS — I10 PRIMARY HYPERTENSION: Chronic | ICD-10-CM

## 2022-06-20 DIAGNOSIS — R51.9 ACUTE NONINTRACTABLE HEADACHE, UNSPECIFIED HEADACHE TYPE: ICD-10-CM

## 2022-06-20 DIAGNOSIS — E66.09 CLASS 2 OBESITY DUE TO EXCESS CALORIES WITHOUT SERIOUS COMORBIDITY WITH BODY MASS INDEX (BMI) OF 36.0 TO 36.9 IN ADULT: Chronic | ICD-10-CM

## 2022-06-20 DIAGNOSIS — J06.9 BACTERIAL URI: Primary | ICD-10-CM

## 2022-06-20 PROBLEM — Z00.00 ANNUAL PHYSICAL EXAM: Status: RESOLVED | Noted: 2022-03-01 | Resolved: 2022-06-20

## 2022-06-20 PROBLEM — E66.812 CLASS 2 OBESITY DUE TO EXCESS CALORIES WITHOUT SERIOUS COMORBIDITY WITH BODY MASS INDEX (BMI) OF 37.0 TO 37.9 IN ADULT: Chronic | Status: RESOLVED | Noted: 2022-03-01 | Resolved: 2022-06-20

## 2022-06-20 PROBLEM — E66.812 CLASS 2 OBESITY DUE TO EXCESS CALORIES WITHOUT SERIOUS COMORBIDITY WITH BODY MASS INDEX (BMI) OF 36.0 TO 36.9 IN ADULT: Status: ACTIVE | Noted: 2022-03-01

## 2022-06-20 LAB
EXPIRATION DATE: NORMAL
EXPIRATION DATE: NORMAL
FLUAV AG NPH QL: NEGATIVE
FLUBV AG NPH QL: NEGATIVE
INTERNAL CONTROL: NORMAL
INTERNAL CONTROL: NORMAL
Lab: NORMAL
Lab: NORMAL
SARS-COV-2 AG UPPER RESP QL IA.RAPID: NOT DETECTED

## 2022-06-20 PROCEDURE — 99214 OFFICE O/P EST MOD 30 MIN: CPT | Performed by: FAMILY MEDICINE

## 2022-06-20 PROCEDURE — 87804 INFLUENZA ASSAY W/OPTIC: CPT | Performed by: FAMILY MEDICINE

## 2022-06-20 PROCEDURE — 87426 SARSCOV CORONAVIRUS AG IA: CPT | Performed by: FAMILY MEDICINE

## 2022-06-20 RX ORDER — AMOXICILLIN AND CLAVULANATE POTASSIUM 875; 125 MG/1; MG/1
1 TABLET, FILM COATED ORAL 2 TIMES DAILY
Qty: 20 TABLET | Refills: 0 | Status: SHIPPED | OUTPATIENT
Start: 2022-06-20 | End: 2023-01-12

## 2022-06-20 RX ORDER — METHYLPREDNISOLONE 4 MG/1
TABLET ORAL
Qty: 21 TABLET | Refills: 0 | Status: SHIPPED | OUTPATIENT
Start: 2022-06-20 | End: 2023-01-12

## 2022-06-20 RX ORDER — FLUCONAZOLE 150 MG/1
150 TABLET ORAL ONCE
Qty: 1 TABLET | Refills: 0 | Status: SHIPPED | OUTPATIENT
Start: 2022-06-20 | End: 2022-06-20

## 2022-06-20 NOTE — ASSESSMENT & PLAN NOTE
Patient's (Body mass index is 36.11 kg/m².) indicates that they are morbidly obese (BMI > 40 or > 35 with obesity - related health condition) with health conditions that include hypertension . Weight is improving with lifestyle modifications. BMI is is above average; BMI management plan is completed. We discussed low calorie, low carb based diet program, portion control and increasing exercise.

## 2022-06-20 NOTE — ASSESSMENT & PLAN NOTE
Hypertension is improving with lifestyle modifications.  Continue current treatment regimen.  Dietary sodium restriction.  Weight loss.  Blood pressure will be reassessed at the next regular appointment.

## 2022-06-20 NOTE — PROGRESS NOTES
"Chief Complaint  Sinus Problem, URI, and swollen lymphnode (Both sides of neck )    Subjective        Saray Vang presents to [facility]  She is here today for an acute visit and for management of her chronic medical conditions. She is  and has three children.  She has one of her daughters with her today who has leukemia is currently undergoing chemotherapy.  She has a FH of thorid and breast cancer.     She has lost 7 lbs over the past few months.     She is having some sinus and ear pressure. She is also having anterior cervical chain lymphadenopathy. She denies fever or chills.        The patient has no other complaints today and denies chest pain, shortness of breath, weakness, numbness, nausea, vomiting, diarrhea, dizziness or syncopal event.      Objective   Vital Signs:  /85 (BP Location: Left arm, Patient Position: Sitting)   Pulse 72   Temp 97.2 °F (36.2 °C)   Resp 18   Ht 160 cm (62.99\")   Wt 92.4 kg (203 lb 12.8 oz)   SpO2 97%   BMI 36.11 kg/m²   Estimated body mass index is 36.11 kg/m² as calculated from the following:    Height as of this encounter: 160 cm (62.99\").    Weight as of this encounter: 92.4 kg (203 lb 12.8 oz).          Physical Exam   Result Review :    CMP    CMP 3/1/22   Glucose 97   BUN 13   Creatinine 1.00   Sodium 140   Potassium 4.5   Chloride 104   Calcium 9.5   Albumin 4.40   Total Bilirubin 0.3   Alkaline Phosphatase 75   AST (SGOT) 21   ALT (SGPT) 29           CBC    CBC 3/1/22   WBC 7.45   RBC 4.75   Hemoglobin 14.2   Hematocrit 42.0   MCV 88.4   MCH 29.9   MCHC 33.8   RDW 12.5   Platelets 359           Lipid Panel    Lipid Panel 3/1/22   Total Cholesterol 245 (A)   Triglycerides 101   HDL Cholesterol 62 (A)   VLDL Cholesterol 18   LDL Cholesterol  165 (A)   LDL/HDL Ratio 2.63   (A) Abnormal value            TSH    TSH 3/1/22   TSH 28.000 (A)   (A) Abnormal value                      Assessment and Plan   Diagnoses and all " orders for this visit:    1. Bacterial URI (Primary)  Assessment & Plan:  The patient was given prescription today of amoxicillin and Medrol Dosepak to be taken as directed.  She was told if her symptoms not improved call back or go to acute care or ER.      2. Class 2 obesity due to excess calories without serious comorbidity with body mass index (BMI) of 36.0 to 36.9 in adult  Assessment & Plan:  Patient's (Body mass index is 36.11 kg/m².) indicates that they are morbidly obese (BMI > 40 or > 35 with obesity - related health condition) with health conditions that include hypertension . Weight is improving with lifestyle modifications. BMI is is above average; BMI management plan is completed. We discussed low calorie, low carb based diet program, portion control and increasing exercise.       3. Acquired hypothyroidism  Assessment & Plan:  Patient was given lab order today for a thyroid level to be managed [place] findings.  She will be continued on her current dosage of 175 microgams of thyroid replacement until her next visit or until the labs have been revealed.    Orders:  -     TSH+Free T4; Future    4. Acute nonintractable headache, unspecified headache type  -     POCT SARS-CoV-2 Antigen SLOAN  -     POCT Influenza A/B    5. Primary hypertension  Assessment & Plan:  Hypertension is improving with lifestyle modifications.  Continue current treatment regimen.  Dietary sodium restriction.  Weight loss.  Blood pressure will be reassessed at the next regular appointment.      Other orders  -     amoxicillin-clavulanate (Augmentin) 875-125 MG per tablet; Take 1 tablet by mouth 2 (Two) Times a Day.  Dispense: 20 tablet; Refill: 0  -     fluconazole (Diflucan) 150 MG tablet; Take 1 tablet by mouth 1 (One) Time for 1 dose.  Dispense: 1 tablet; Refill: 0  -     methylPREDNISolone (MEDROL) 4 MG dose pack; Take as directed on package instructions.  Dispense: 21 tablet; Refill: 0           Follow Up   Return if symptoms  worsen or fail to improve.  Patient was given instructions and counseling regarding her condition or for health maintenance advice. Please see specific information pulled into the AVS if appropriate.

## 2022-06-20 NOTE — ASSESSMENT & PLAN NOTE
The patient was given prescription today of amoxicillin and Medrol Dosepak to be taken as directed.  She was told if her symptoms not improved call back or go to acute care or ER.

## 2022-06-20 NOTE — ASSESSMENT & PLAN NOTE
Patient was given lab order today for a thyroid level to be managed [place] findings.  She will be continued on her current dosage of 175 microgams of thyroid replacement until her next visit or until the labs have been revealed.

## 2022-08-25 RX ORDER — LEVOTHYROXINE SODIUM 175 UG/1
175 TABLET ORAL DAILY
Qty: 90 TABLET | Refills: 0 | Status: SHIPPED | OUTPATIENT
Start: 2022-08-25 | End: 2022-12-27

## 2022-08-25 NOTE — TELEPHONE ENCOUNTER
Caller: Taj, April    Relationship: Self    Best call back number: [phone]    Requested Prescriptions:   Requested Prescriptions     Pending Prescriptions Disp Refills   • levothyroxine (SYNTHROID, LEVOTHROID) 175 MCG tablet 90 tablet 0     Sig: Take 1 tablet by mouth Daily.        Pharmacy where request should be sent: [address] [phone] [facility] [phone] [facility]     Does the patient have less than a 3 day supply:  [x] Yes  [] No    Mir WILLARD Rep   08/25/22 12:35 EDT

## 2022-12-27 ENCOUNTER — LAB (OUTPATIENT)
Dept: LAB | Facility: HOSPITAL | Age: 42
End: 2022-12-27

## 2022-12-27 DIAGNOSIS — E03.9 ACQUIRED HYPOTHYROIDISM: ICD-10-CM

## 2022-12-27 LAB
T4 FREE SERPL-MCNC: 1 NG/DL (ref 0.93–1.7)
TSH SERPL DL<=0.05 MIU/L-ACNC: 0.04 UIU/ML (ref 0.27–4.2)

## 2022-12-27 PROCEDURE — 84443 ASSAY THYROID STIM HORMONE: CPT

## 2022-12-27 PROCEDURE — 84439 ASSAY OF FREE THYROXINE: CPT

## 2022-12-27 PROCEDURE — 36415 COLL VENOUS BLD VENIPUNCTURE: CPT

## 2022-12-27 RX ORDER — LEVOTHYROXINE SODIUM 175 UG/1
TABLET ORAL
Qty: 90 TABLET | Refills: 1 | Status: SHIPPED | OUTPATIENT
Start: 2022-12-27

## 2023-01-12 ENCOUNTER — OFFICE VISIT (OUTPATIENT)
Dept: FAMILY MEDICINE CLINIC | Facility: CLINIC | Age: 43
End: 2023-01-12
Payer: COMMERCIAL

## 2023-01-12 VITALS
BODY MASS INDEX: 35.01 KG/M2 | HEIGHT: 63 IN | DIASTOLIC BLOOD PRESSURE: 89 MMHG | OXYGEN SATURATION: 96 % | WEIGHT: 197.6 LBS | HEART RATE: 77 BPM | SYSTOLIC BLOOD PRESSURE: 133 MMHG | RESPIRATION RATE: 20 BRPM | TEMPERATURE: 97.7 F

## 2023-01-12 DIAGNOSIS — E03.9 ACQUIRED HYPOTHYROIDISM: Primary | Chronic | ICD-10-CM

## 2023-01-12 DIAGNOSIS — Z12.31 ENCOUNTER FOR SCREENING MAMMOGRAM FOR MALIGNANT NEOPLASM OF BREAST: ICD-10-CM

## 2023-01-12 DIAGNOSIS — D50.9 IRON DEFICIENCY ANEMIA, UNSPECIFIED IRON DEFICIENCY ANEMIA TYPE: Chronic | ICD-10-CM

## 2023-01-12 DIAGNOSIS — E66.09 CLASS 2 OBESITY DUE TO EXCESS CALORIES WITHOUT SERIOUS COMORBIDITY WITH BODY MASS INDEX (BMI) OF 35.0 TO 35.9 IN ADULT: Chronic | ICD-10-CM

## 2023-01-12 DIAGNOSIS — I10 PRIMARY HYPERTENSION: Chronic | ICD-10-CM

## 2023-01-12 DIAGNOSIS — Z00.00 ANNUAL PHYSICAL EXAM: ICD-10-CM

## 2023-01-12 DIAGNOSIS — R10.2 PELVIC PAIN: ICD-10-CM

## 2023-01-12 DIAGNOSIS — E78.2 MIXED HYPERLIPIDEMIA: Chronic | ICD-10-CM

## 2023-01-12 PROBLEM — E66.812 CLASS 2 OBESITY DUE TO EXCESS CALORIES WITHOUT SERIOUS COMORBIDITY WITH BODY MASS INDEX (BMI) OF 36.0 TO 36.9 IN ADULT: Chronic | Status: RESOLVED | Noted: 2022-03-01 | Resolved: 2023-01-12

## 2023-01-12 PROBLEM — B96.89 BACTERIAL URI: Status: RESOLVED | Noted: 2022-06-20 | Resolved: 2023-01-12

## 2023-01-12 PROBLEM — J06.9 BACTERIAL URI: Status: RESOLVED | Noted: 2022-06-20 | Resolved: 2023-01-12

## 2023-01-12 LAB
B-HCG UR QL: NEGATIVE
EXPIRATION DATE: NORMAL
INTERNAL NEGATIVE CONTROL: NORMAL
INTERNAL POSITIVE CONTROL: NORMAL
Lab: NORMAL

## 2023-01-12 PROCEDURE — 81025 URINE PREGNANCY TEST: CPT | Performed by: FAMILY MEDICINE

## 2023-01-12 PROCEDURE — 99214 OFFICE O/P EST MOD 30 MIN: CPT | Performed by: FAMILY MEDICINE

## 2023-01-12 NOTE — PROGRESS NOTES
"Chief Complaint  Hypothyroidism, Hypertension, Results, and Abdominal Cramping    Subjective        April Taj presents to [facility]  History of Present Illness  She is here today for management of her chronic medical conditions.  She is  and has three children.  She has one of her daughters with her today who has leukemia is currently undergoing chemotherapy.  She has a FH of thorid and breast cancer.    She has lost 13 lbs in the past year.     She is having abdominal pain today.      The patient has no other complaints today and denies chest pain, shortness of breath, weakness, numbness, nausea, vomiting, diarrhea, dizziness or syncopal event.      Objective   Vital Signs:  /89 (BP Location: Left arm, Patient Position: Sitting)   Pulse 77   Temp 97.7 °F (36.5 °C)   Resp 20   Ht 160 cm (62.99\")   Wt 89.6 kg (197 lb 9.6 oz)   SpO2 96%   BMI 35.01 kg/m²   Estimated body mass index is 35.01 kg/m² as calculated from the following:    Height as of this encounter: 160 cm (62.99\").    Weight as of this encounter: 89.6 kg (197 lb 9.6 oz).             Physical Exam  Vitals reviewed.   Constitutional:       Appearance: Normal appearance. She is well-developed. She is obese.   HENT:      Head: Normocephalic and atraumatic.      Right Ear: External ear normal.      Left Ear: External ear normal.      Mouth/Throat:      Pharynx: No oropharyngeal exudate.   Eyes:      Conjunctiva/sclera: Conjunctivae normal.      Pupils: Pupils are equal, round, and reactive to light.   Neck:      Vascular: No carotid bruit.   Cardiovascular:      Rate and Rhythm: Normal rate and regular rhythm.      Heart sounds: No murmur heard.    No friction rub. No gallop.   Pulmonary:      Effort: Pulmonary effort is normal.      Breath sounds: Normal breath sounds. No wheezing or rhonchi.   Abdominal:      General: There is no distension.   Skin:     General: Skin is warm and dry.   Neurological: "      Mental Status: She is alert and oriented to person, place, and time.      Cranial Nerves: No cranial nerve deficit.      Motor: No weakness.   Psychiatric:         Mood and Affect: Mood and affect normal.         Behavior: Behavior normal.         Thought Content: Thought content normal.         Judgment: Judgment normal.        Result Review :    CMP    CMP 3/1/22   Glucose 97   BUN 13   Creatinine 1.00   eGFR 73.2   Sodium 140   Potassium 4.5   Chloride 104   Calcium 9.5   Total Protein 7.8   Albumin 4.40   Globulin 3.4   Total Bilirubin 0.3   Alkaline Phosphatase 75   AST (SGOT) 21   ALT (SGPT) 29   Albumin/Globulin Ratio 1.3   BUN/Creatinine Ratio 13.0   Anion Gap 10.7      Comments are available for some flowsheets but are not being displayed.           CBC    CBC 3/1/22   WBC 7.45   RBC 4.75   Hemoglobin 14.2   Hematocrit 42.0   MCV 88.4   MCH 29.9   MCHC 33.8   RDW 12.5   Platelets 359           Lipid Panel    Lipid Panel 3/1/22   Total Cholesterol 245 (A)   Triglycerides 101   HDL Cholesterol 62 (A)   VLDL Cholesterol 18   LDL Cholesterol  165 (A)   LDL/HDL Ratio 2.63   (A) Abnormal value            TSH    TSH 3/1/22 12/27/22   TSH 28.000 (A) 0.036 (A)   (A) Abnormal value                         Assessment and Plan   Diagnoses and all orders for this visit:    1. Acquired hypothyroidism (Primary)  Assessment & Plan:  She is doing well on 175 mcg daily. She will be continued on this dose of levothyroxine and we will recheck her levels in 6 months.     Orders:  -     TSH+Free T4; Future  -     TSH+Free T4; Standing    2. Pelvic pain  -     POCT pregnancy, urine    3. Encounter for screening mammogram for malignant neoplasm of breast  -     Mammo Screening Bilateral With CAD; Future    4. Class 2 obesity due to excess calories without serious comorbidity with body mass index (BMI) of 35.0 to 35.9 in adult  Assessment & Plan:  Patient's (Body mass index is 35.01 kg/m².) indicates that they are obese (BMI  >30) with health conditions that include hypertension . Weight is improving with lifestyle modifications. BMI is is above average; BMI management plan is completed. We discussed low calorie, low carb based diet program, portion control and increasing exercise.       5. Primary hypertension  Assessment & Plan:  Hypertension is improving with treatment.  Continue current treatment regimen.  Dietary sodium restriction.  Weight loss.  Blood pressure will be reassessed at the next regular appointment.      6. Mixed hyperlipidemia  Assessment & Plan:  Lipid abnormalities are improving with treatment.  Nutritional counseling was provided.  Lipids will be reassessed in 6 months.    The 10-year ASCVD risk score (Raimundo NOLASCO, et al., 2019) is: 0.8%    Values used to calculate the score:      Age: 42 years      Sex: Female      Is Non- : No      Diabetic: No      Tobacco smoker: No      Systolic Blood Pressure: 133 mmHg      Is BP treated: No      HDL Cholesterol: 62 mg/dL      Total Cholesterol: 245 mg/dL      Orders:  -     Lipid Panel; Future    7. Iron deficiency anemia, unspecified iron deficiency anemia type  -     Iron and TIBC; Future  -     Ferritin; Future    8. Annual physical exam  -     Comprehensive Metabolic Panel; Future  -     CBC & Differential; Future           Follow Up   Return in about 6 months (around 7/12/2023).  Patient was given instructions and counseling regarding her condition or for health maintenance advice. Please see specific information pulled into the AVS if appropriate.

## 2023-01-12 NOTE — ASSESSMENT & PLAN NOTE
Lipid abnormalities are improving with treatment.  Nutritional counseling was provided.  Lipids will be reassessed in 6 months.    The 10-year ASCVD risk score (Raimundo NOLASCO, et al., 2019) is: 0.8%    Values used to calculate the score:      Age: 42 years      Sex: Female      Is Non- : No      Diabetic: No      Tobacco smoker: No      Systolic Blood Pressure: 133 mmHg      Is BP treated: No      HDL Cholesterol: 62 mg/dL      Total Cholesterol: 245 mg/dL

## 2023-01-12 NOTE — ASSESSMENT & PLAN NOTE
She is doing well on 175 mcg daily. She will be continued on this dose of levothyroxine and we will recheck her levels in 6 months.

## 2023-01-12 NOTE — ASSESSMENT & PLAN NOTE
Patient's (Body mass index is 35.01 kg/m².) indicates that they are obese (BMI >30) with health conditions that include hypertension . Weight is improving with lifestyle modifications. BMI is is above average; BMI management plan is completed. We discussed low calorie, low carb based diet program, portion control and increasing exercise.

## 2023-01-16 ENCOUNTER — TRANSCRIBE ORDERS (OUTPATIENT)
Dept: ADMINISTRATIVE | Facility: HOSPITAL | Age: 43
End: 2023-01-16
Payer: COMMERCIAL

## 2023-01-16 DIAGNOSIS — Z12.31 ENCOUNTER FOR SCREENING MAMMOGRAM FOR MALIGNANT NEOPLASM OF BREAST: Primary | ICD-10-CM

## 2023-01-17 ENCOUNTER — TELEPHONE (OUTPATIENT)
Dept: OBSTETRICS AND GYNECOLOGY | Facility: CLINIC | Age: 43
End: 2023-01-17
Payer: COMMERCIAL

## 2023-01-17 NOTE — TELEPHONE ENCOUNTER
Caller: Taj April    Relationship to patient: Self    Best call back number: [phone]    Patient is needing:  PT HAS BEEN CRAMPING, NO BLEEDING, BREAST TENDERNESS. NASEUOUS. WANTS TO BE SEEN SOON W/

## 2023-01-24 ENCOUNTER — TELEPHONE (OUTPATIENT)
Dept: OBSTETRICS AND GYNECOLOGY | Facility: CLINIC | Age: 43
End: 2023-01-24

## 2023-01-24 ENCOUNTER — OFFICE VISIT (OUTPATIENT)
Dept: OBSTETRICS AND GYNECOLOGY | Facility: CLINIC | Age: 43
End: 2023-01-24
Payer: COMMERCIAL

## 2023-01-24 VITALS
DIASTOLIC BLOOD PRESSURE: 89 MMHG | HEART RATE: 92 BPM | WEIGHT: 198 LBS | BODY MASS INDEX: 35.08 KG/M2 | SYSTOLIC BLOOD PRESSURE: 133 MMHG | HEIGHT: 63 IN

## 2023-01-24 DIAGNOSIS — N95.1 PERIMENOPAUSAL SYMPTOMS: ICD-10-CM

## 2023-01-24 DIAGNOSIS — N64.4 MASTALGIA: ICD-10-CM

## 2023-01-24 DIAGNOSIS — R10.2 PELVIC PAIN: ICD-10-CM

## 2023-01-24 DIAGNOSIS — Z01.419 WELL WOMAN EXAM: Primary | ICD-10-CM

## 2023-01-24 DIAGNOSIS — D50.9 IRON DEFICIENCY ANEMIA, UNSPECIFIED IRON DEFICIENCY ANEMIA TYPE: Chronic | ICD-10-CM

## 2023-01-24 LAB
ESTRADIOL SERPL HS-MCNC: 122 PG/ML
FERRITIN SERPL-MCNC: 66.5 NG/ML (ref 13–150)
FSH SERPL-ACNC: 3.42 MIU/ML

## 2023-01-24 PROCEDURE — G0123 SCREEN CERV/VAG THIN LAYER: HCPCS | Performed by: OBSTETRICS & GYNECOLOGY

## 2023-01-24 PROCEDURE — 82728 ASSAY OF FERRITIN: CPT | Performed by: OBSTETRICS & GYNECOLOGY

## 2023-01-24 PROCEDURE — 99396 PREV VISIT EST AGE 40-64: CPT | Performed by: OBSTETRICS & GYNECOLOGY

## 2023-01-24 PROCEDURE — 83001 ASSAY OF GONADOTROPIN (FSH): CPT | Performed by: OBSTETRICS & GYNECOLOGY

## 2023-01-24 PROCEDURE — 3008F BODY MASS INDEX DOCD: CPT | Performed by: OBSTETRICS & GYNECOLOGY

## 2023-01-24 PROCEDURE — 2014F MENTAL STATUS ASSESS: CPT | Performed by: OBSTETRICS & GYNECOLOGY

## 2023-01-24 PROCEDURE — 82670 ASSAY OF TOTAL ESTRADIOL: CPT | Performed by: OBSTETRICS & GYNECOLOGY

## 2023-01-24 NOTE — PROGRESS NOTES
"Well Woman Visit    CC: JAMAR      HPI:   42 y.o. who presents for a well woman exam.  She complains of breast tenderness and pelvic cramping since December. She has not had any menstrual bleeding. She has had an endometrial ablation.    History: PMHx, Meds, Allergies, PSHx, Social Hx, and POBHx all reviewed and updated.    /89   Pulse 92   Ht 160 cm (62.99\")   Wt 89.8 kg (198 lb)   Breastfeeding No   BMI 35.09 kg/m²     Physical Exam  Vitals and nursing note reviewed. Exam conducted with a chaperone present.   Constitutional:       General: She is not in acute distress.     Appearance: Normal appearance. She is obese. She is not ill-appearing.   HENT:      Head: Normocephalic and atraumatic.   Eyes:      Extraocular Movements: Extraocular movements intact.   Neck:      Thyroid: No thyroid mass or thyromegaly.   Cardiovascular:      Rate and Rhythm: Regular rhythm.      Heart sounds: No murmur heard.  Pulmonary:      Effort: Pulmonary effort is normal.      Breath sounds: No wheezing.   Chest:   Breasts:     Breasts are symmetrical.      Right: Tenderness present. No swelling, bleeding, inverted nipple, mass, nipple discharge or skin change.      Left: Tenderness present. No swelling, bleeding, inverted nipple, mass, nipple discharge or skin change.   Abdominal:      General: Abdomen is flat. There is no distension.      Palpations: Abdomen is soft. There is no mass.      Tenderness: There is no abdominal tenderness. There is no guarding or rebound.      Hernia: No hernia is present. There is no hernia in the left inguinal area or right inguinal area.   Genitourinary:     General: Normal vulva.      Exam position: Lithotomy position.      Pubic Area: No rash.       Labia:         Right: No rash, tenderness, lesion or injury.         Left: No rash, tenderness, lesion or injury.       Urethra: No prolapse, urethral pain, urethral swelling or urethral lesion.      Vagina: Normal. No vaginal discharge, " tenderness or prolapsed vaginal walls.      Cervix: Normal.      Uterus: Normal. Tender.       Adnexa:         Right: Tenderness present. No mass or fullness.          Left: Tenderness present. No mass or fullness.     Lymphadenopathy:      Upper Body:      Right upper body: No supraclavicular or axillary adenopathy.      Left upper body: No supraclavicular or axillary adenopathy.   Skin:     General: Skin is warm and dry.   Neurological:      Mental Status: She is alert and oriented to person, place, and time.   Psychiatric:         Mood and Affect: Mood normal.         Behavior: Behavior normal.         Thought Content: Thought content normal.         ASSESSMENT AND PLAN:    Diagnoses and all orders for this visit:    1. Well woman exam (Primary)  Assessment & Plan:  Pap  Recommend daily multivitamin with folic acid    Orders:  -     IGP,rfx Aptima HPV All Pth    2. Perimenopausal symptoms  Assessment & Plan:  Check FSH and estradiol    Orders:  -     Follicle Stimulating Hormone  -     Estradiol  -     US Pelvis Transvaginal Non OB; Future    3. Mastalgia  Assessment & Plan:  Check diagnostic mammogram    Orders:  -     Mammo Diagnostic Bilateral With CAD; Future    4. Pelvic pain  Assessment & Plan:  Check pelvic ultrasound recommend OTC Tylenol and or ibuprofen as needed        Preventative:   MMG  Recommend FLU vaccine this season, R/B discussed  s/p COVID vaccine  COVID booster recommended    She understands the importance of having any ordered tests to be performed in a timely fashion.  The risks of not performing them include, but are not limited to, advanced cancer stages, bone loss from osteoporosis and/or subsequent increase in morbidity and/or mortality.  She is encouraged to review her results online and/or contact or office if she has questions.     Follow Up:  Return in about 4 weeks (around 2/21/2023) for Recheck.    Praful Topete MD  01/24/2023

## 2023-01-31 LAB
CONV .: NORMAL
CYTOLOGIST CVX/VAG CYTO: NORMAL
CYTOLOGY CVX/VAG DOC CYTO: NORMAL
CYTOLOGY CVX/VAG DOC THIN PREP: NORMAL
DX ICD CODE: NORMAL
HIV 1 & 2 AB SER-IMP: NORMAL
Lab: NORMAL
OTHER STN SPEC: NORMAL
STAT OF ADQ CVX/VAG CYTO-IMP: NORMAL

## 2023-02-01 ENCOUNTER — TRANSCRIBE ORDERS (OUTPATIENT)
Dept: ADMINISTRATIVE | Facility: HOSPITAL | Age: 43
End: 2023-02-01
Payer: COMMERCIAL

## 2023-02-01 DIAGNOSIS — N64.4 MASTALGIA: Primary | ICD-10-CM

## 2023-02-20 ENCOUNTER — HOSPITAL ENCOUNTER (OUTPATIENT)
Dept: ULTRASOUND IMAGING | Facility: HOSPITAL | Age: 43
Discharge: HOME | End: 2023-02-20
Admitting: OBSTETRICS & GYNECOLOGY
Payer: COMMERCIAL

## 2023-02-20 DIAGNOSIS — N95.1 PERIMENOPAUSAL SYMPTOMS: ICD-10-CM

## 2023-02-20 PROCEDURE — 76830 TRANSVAGINAL US NON-OB: CPT

## 2023-02-20 PROCEDURE — 76856 US EXAM PELVIC COMPLETE: CPT

## 2023-02-23 DIAGNOSIS — N64.4 MASTALGIA: Primary | ICD-10-CM

## 2023-02-24 ENCOUNTER — HOSPITAL ENCOUNTER (OUTPATIENT)
Dept: MAMMOGRAPHY | Facility: HOSPITAL | Age: 43
Discharge: HOME | End: 2023-02-24
Payer: COMMERCIAL

## 2023-02-24 ENCOUNTER — HOSPITAL ENCOUNTER (OUTPATIENT)
Dept: ULTRASOUND IMAGING | Facility: HOSPITAL | Age: 43
Discharge: HOME | End: 2023-02-24
Payer: COMMERCIAL

## 2023-02-24 DIAGNOSIS — N64.4 MASTALGIA: ICD-10-CM

## 2023-02-24 PROCEDURE — 77066 DX MAMMO INCL CAD BI: CPT

## 2023-02-24 PROCEDURE — G0279 TOMOSYNTHESIS, MAMMO: HCPCS

## 2023-02-27 ENCOUNTER — OFFICE VISIT (OUTPATIENT)
Dept: OBSTETRICS AND GYNECOLOGY | Facility: CLINIC | Age: 43
End: 2023-02-27
Payer: COMMERCIAL

## 2023-02-27 VITALS
SYSTOLIC BLOOD PRESSURE: 133 MMHG | BODY MASS INDEX: 35.44 KG/M2 | HEART RATE: 80 BPM | WEIGHT: 200 LBS | DIASTOLIC BLOOD PRESSURE: 82 MMHG | HEIGHT: 63 IN

## 2023-02-27 DIAGNOSIS — R10.2 PELVIC PAIN: Primary | ICD-10-CM

## 2023-02-27 PROBLEM — Z01.419 WELL WOMAN EXAM: Status: RESOLVED | Noted: 2023-01-24 | Resolved: 2023-02-27

## 2023-02-27 PROCEDURE — 99213 OFFICE O/P EST LOW 20 MIN: CPT | Performed by: OBSTETRICS & GYNECOLOGY

## 2023-02-27 NOTE — PROGRESS NOTES
"GYN Visit    CC: Follow-up ultrasound    HPI:   42 y.o. who presents in follow-up of a pelvic ultrasound for pelvic pain.  Patient reports having some mild pelvic cramping around the time she thinks she would normally have her menstrual cycle.  She has had an endometrial ablation.  She does not have any menstrual bleeding.  No new problems or concerns today.  The patient reports that her symptoms this last menstrual cycle were less than the previous 2 months      History: PMHx, Meds, Allergies, PSHx, Social Hx, and POBHx all reviewed and updated.    /82   Pulse 80   Ht 160 cm (62.99\")   Wt 90.7 kg (200 lb)   Breastfeeding No   BMI 35.44 kg/m²     Physical Exam  Vitals and nursing note reviewed. Exam conducted with a chaperone present.   Constitutional:       General: She is not in acute distress.     Appearance: Normal appearance. She is not ill-appearing.   Neurological:      Mental Status: She is alert.   Psychiatric:         Mood and Affect: Mood normal.         Behavior: Behavior normal.         Thought Content: Thought content normal.         Judgment: Judgment normal.           ASSESSMENT AND PLAN:  Diagnoses and all orders for this visit:    1. Pelvic pain (Primary)  Assessment & Plan:  The patient's ultrasound is reassuring.  The ultrasound just shows changes associated with an endometrial ablation.  Given the patient's symptoms occur around the time of her menstrual cycle I suspect it is still uterine activity.  We discussed endometrial ablation does not change any of the hormonal fluctuations that occur during a normal menstrual cycle.  The uterus is still responsive these hormones, there just is not an endometrial lining to shed anymore.  This could be simply normal dysmenorrhea from a normal menstrual cycle.  Recommended OTC Tylenol and or ibuprofen.  The patient will continue to monitor her symptoms and if they worsen or become more persistent that she will return for further " evaluation.        Follow Up:  Return for Annual physical.      Praful Topete MD  02/27/2023

## 2023-02-28 NOTE — ASSESSMENT & PLAN NOTE
The patient's ultrasound is reassuring.  The ultrasound just shows changes associated with an endometrial ablation.  Given the patient's symptoms occur around the time of her menstrual cycle I suspect it is still uterine activity.  We discussed endometrial ablation does not change any of the hormonal fluctuations that occur during a normal menstrual cycle.  The uterus is still responsive these hormones, there just is not an endometrial lining to shed anymore.  This could be simply normal dysmenorrhea from a normal menstrual cycle.  Recommended OTC Tylenol and or ibuprofen.  The patient will continue to monitor her symptoms and if they worsen or become more persistent that she will return for further evaluation.

## 2023-04-04 ENCOUNTER — TELEPHONE (OUTPATIENT)
Dept: FAMILY MEDICINE CLINIC | Facility: CLINIC | Age: 43
End: 2023-04-04
Payer: COMMERCIAL

## 2023-04-04 RX ORDER — CETIRIZINE HYDROCHLORIDE 10 MG/1
TABLET ORAL
Qty: 90 TABLET | Refills: 0 | OUTPATIENT
Start: 2023-04-04

## 2023-04-04 RX ORDER — CETIRIZINE HYDROCHLORIDE 10 MG/1
10 TABLET ORAL DAILY
Qty: 90 TABLET | Refills: 1 | Status: SHIPPED | OUTPATIENT
Start: 2023-04-04

## 2023-06-27 PROBLEM — S06.0X0A CONCUSSION WITH NO LOSS OF CONSCIOUSNESS: Status: ACTIVE | Noted: 2023-06-27

## 2023-08-02 ENCOUNTER — OFFICE VISIT (OUTPATIENT)
Dept: FAMILY MEDICINE CLINIC | Facility: CLINIC | Age: 43
End: 2023-08-02
Payer: COMMERCIAL

## 2023-08-02 VITALS
SYSTOLIC BLOOD PRESSURE: 131 MMHG | WEIGHT: 189.2 LBS | BODY MASS INDEX: 33.52 KG/M2 | TEMPERATURE: 97.7 F | HEIGHT: 63 IN | DIASTOLIC BLOOD PRESSURE: 84 MMHG

## 2023-08-02 DIAGNOSIS — E66.9 OBESITY (BMI 30-39.9): ICD-10-CM

## 2023-08-02 DIAGNOSIS — H92.09 OTALGIA, UNSPECIFIED LATERALITY: ICD-10-CM

## 2023-08-02 DIAGNOSIS — H66.90 ACUTE OTITIS MEDIA, UNSPECIFIED OTITIS MEDIA TYPE: ICD-10-CM

## 2023-08-02 DIAGNOSIS — R09.81 NASAL CONGESTION: Primary | ICD-10-CM

## 2023-08-02 DIAGNOSIS — J02.9 SORE THROAT: ICD-10-CM

## 2023-08-02 LAB
EXPIRATION DATE: NORMAL
INTERNAL CONTROL: NORMAL
Lab: NORMAL
S PYO AG THROAT QL: NEGATIVE

## 2023-08-02 PROCEDURE — 87880 STREP A ASSAY W/OPTIC: CPT | Performed by: NURSE PRACTITIONER

## 2023-08-02 PROCEDURE — 3079F DIAST BP 80-89 MM HG: CPT | Performed by: NURSE PRACTITIONER

## 2023-08-02 PROCEDURE — 3075F SYST BP GE 130 - 139MM HG: CPT | Performed by: NURSE PRACTITIONER

## 2023-08-02 PROCEDURE — 99214 OFFICE O/P EST MOD 30 MIN: CPT | Performed by: NURSE PRACTITIONER

## 2023-08-02 RX ORDER — FLUCONAZOLE 150 MG/1
150 TABLET ORAL ONCE
Qty: 1 TABLET | Refills: 0 | Status: SHIPPED | OUTPATIENT
Start: 2023-08-02 | End: 2023-08-02

## 2023-08-02 RX ORDER — AMOXICILLIN 500 MG/1
500 CAPSULE ORAL 2 TIMES DAILY
Qty: 20 CAPSULE | Refills: 0 | Status: SHIPPED | OUTPATIENT
Start: 2023-08-02 | End: 2023-08-12

## 2023-08-28 ENCOUNTER — LAB (OUTPATIENT)
Dept: LAB | Facility: HOSPITAL | Age: 43
End: 2023-08-28
Payer: COMMERCIAL

## 2023-08-28 ENCOUNTER — OFFICE VISIT (OUTPATIENT)
Dept: FAMILY MEDICINE CLINIC | Facility: CLINIC | Age: 43
End: 2023-08-28
Payer: COMMERCIAL

## 2023-08-28 VITALS
OXYGEN SATURATION: 99 % | DIASTOLIC BLOOD PRESSURE: 64 MMHG | RESPIRATION RATE: 18 BRPM | HEART RATE: 81 BPM | BODY MASS INDEX: 34.76 KG/M2 | TEMPERATURE: 98 F | WEIGHT: 196.2 LBS | HEIGHT: 63 IN | SYSTOLIC BLOOD PRESSURE: 131 MMHG

## 2023-08-28 DIAGNOSIS — E78.2 MIXED HYPERLIPIDEMIA: Chronic | ICD-10-CM

## 2023-08-28 DIAGNOSIS — I10 PRIMARY HYPERTENSION: Chronic | ICD-10-CM

## 2023-08-28 DIAGNOSIS — E03.9 ACQUIRED HYPOTHYROIDISM: Chronic | ICD-10-CM

## 2023-08-28 DIAGNOSIS — Z00.00 ANNUAL PHYSICAL EXAM: Primary | ICD-10-CM

## 2023-08-28 DIAGNOSIS — Z11.59 NEED FOR HEPATITIS C SCREENING TEST: ICD-10-CM

## 2023-08-28 DIAGNOSIS — Z00.00 ANNUAL PHYSICAL EXAM: ICD-10-CM

## 2023-08-28 DIAGNOSIS — D50.9 IRON DEFICIENCY ANEMIA, UNSPECIFIED IRON DEFICIENCY ANEMIA TYPE: Chronic | ICD-10-CM

## 2023-08-28 DIAGNOSIS — E66.09 CLASS 1 OBESITY DUE TO EXCESS CALORIES WITH SERIOUS COMORBIDITY AND BODY MASS INDEX (BMI) OF 34.0 TO 34.9 IN ADULT: ICD-10-CM

## 2023-08-28 PROBLEM — S06.0XAA CONCUSSION: Status: ACTIVE | Noted: 2023-08-28

## 2023-08-28 PROBLEM — E66.811 CLASS 1 OBESITY DUE TO EXCESS CALORIES WITH SERIOUS COMORBIDITY AND BODY MASS INDEX (BMI) OF 34.0 TO 34.9 IN ADULT: Status: ACTIVE | Noted: 2023-08-28

## 2023-08-28 PROBLEM — E66.9 OBESITY (BMI 30-39.9): Status: RESOLVED | Noted: 2022-03-01 | Resolved: 2023-08-28

## 2023-08-28 PROBLEM — E66.811 CLASS 1 OBESITY DUE TO EXCESS CALORIES WITH SERIOUS COMORBIDITY AND BODY MASS INDEX (BMI) OF 34.0 TO 34.9 IN ADULT: Chronic | Status: ACTIVE | Noted: 2023-08-28

## 2023-08-28 LAB
ALBUMIN SERPL-MCNC: 4.6 G/DL (ref 3.5–5.2)
ALBUMIN/GLOB SERPL: 2.7 G/DL
ALP SERPL-CCNC: 94 U/L (ref 39–117)
ALT SERPL W P-5'-P-CCNC: 29 U/L (ref 1–33)
ANION GAP SERPL CALCULATED.3IONS-SCNC: 10 MMOL/L (ref 5–15)
AST SERPL-CCNC: 29 U/L (ref 1–32)
BASOPHILS # BLD AUTO: 0.05 10*3/MM3 (ref 0–0.2)
BASOPHILS NFR BLD AUTO: 0.7 % (ref 0–1.5)
BILIRUB SERPL-MCNC: 0.2 MG/DL (ref 0–1.2)
BUN SERPL-MCNC: 9 MG/DL (ref 6–20)
BUN/CREAT SERPL: 12.7 (ref 7–25)
CALCIUM SPEC-SCNC: 9.7 MG/DL (ref 8.6–10.5)
CHLORIDE SERPL-SCNC: 106 MMOL/L (ref 98–107)
CHOLEST SERPL-MCNC: 197 MG/DL (ref 0–200)
CO2 SERPL-SCNC: 26 MMOL/L (ref 22–29)
CREAT SERPL-MCNC: 0.71 MG/DL (ref 0.57–1)
DEPRECATED RDW RBC AUTO: 38 FL (ref 37–54)
EGFRCR SERPLBLD CKD-EPI 2021: 109.7 ML/MIN/1.73
EOSINOPHIL # BLD AUTO: 0.34 10*3/MM3 (ref 0–0.4)
EOSINOPHIL NFR BLD AUTO: 4.4 % (ref 0.3–6.2)
ERYTHROCYTE [DISTWIDTH] IN BLOOD BY AUTOMATED COUNT: 12.4 % (ref 12.3–15.4)
GLOBULIN UR ELPH-MCNC: 1.7 GM/DL
GLUCOSE SERPL-MCNC: 86 MG/DL (ref 65–99)
HCT VFR BLD AUTO: 42.8 % (ref 34–46.6)
HCV AB SER DONR QL: NORMAL
HDLC SERPL-MCNC: 57 MG/DL (ref 40–60)
HGB BLD-MCNC: 14.8 G/DL (ref 12–15.9)
IMM GRANULOCYTES # BLD AUTO: 0.04 10*3/MM3 (ref 0–0.05)
IMM GRANULOCYTES NFR BLD AUTO: 0.5 % (ref 0–0.5)
IRON 24H UR-MRATE: 59 MCG/DL (ref 37–145)
IRON SATN MFR SERPL: 16 % (ref 20–50)
LDLC SERPL CALC-MCNC: 109 MG/DL (ref 0–100)
LDLC/HDLC SERPL: 1.82 {RATIO}
LYMPHOCYTES # BLD AUTO: 2.35 10*3/MM3 (ref 0.7–3.1)
LYMPHOCYTES NFR BLD AUTO: 30.6 % (ref 19.6–45.3)
MCH RBC QN AUTO: 29.4 PG (ref 26.6–33)
MCHC RBC AUTO-ENTMCNC: 34.6 G/DL (ref 31.5–35.7)
MCV RBC AUTO: 85.1 FL (ref 79–97)
MONOCYTES # BLD AUTO: 0.53 10*3/MM3 (ref 0.1–0.9)
MONOCYTES NFR BLD AUTO: 6.9 % (ref 5–12)
NEUTROPHILS NFR BLD AUTO: 4.38 10*3/MM3 (ref 1.7–7)
NEUTROPHILS NFR BLD AUTO: 56.9 % (ref 42.7–76)
NRBC BLD AUTO-RTO: 0 /100 WBC (ref 0–0.2)
PLATELET # BLD AUTO: 360 10*3/MM3 (ref 140–450)
PMV BLD AUTO: 10.6 FL (ref 6–12)
POTASSIUM SERPL-SCNC: 4.4 MMOL/L (ref 3.5–5.2)
PROT SERPL-MCNC: 6.3 G/DL (ref 6–8.5)
RBC # BLD AUTO: 5.03 10*6/MM3 (ref 3.77–5.28)
SODIUM SERPL-SCNC: 142 MMOL/L (ref 136–145)
T4 FREE SERPL-MCNC: 2.39 NG/DL (ref 0.93–1.7)
TIBC SERPL-MCNC: 373 MCG/DL (ref 298–536)
TRANSFERRIN SERPL-MCNC: 250 MG/DL (ref 200–360)
TRIGL SERPL-MCNC: 181 MG/DL (ref 0–150)
TSH SERPL DL<=0.05 MIU/L-ACNC: <0.005 UIU/ML (ref 0.27–4.2)
VLDLC SERPL-MCNC: 31 MG/DL (ref 5–40)
WBC NRBC COR # BLD: 7.69 10*3/MM3 (ref 3.4–10.8)

## 2023-08-28 PROCEDURE — 86803 HEPATITIS C AB TEST: CPT

## 2023-08-28 PROCEDURE — 83540 ASSAY OF IRON: CPT

## 2023-08-28 PROCEDURE — 36415 COLL VENOUS BLD VENIPUNCTURE: CPT

## 2023-08-28 PROCEDURE — 84466 ASSAY OF TRANSFERRIN: CPT

## 2023-08-28 PROCEDURE — 85025 COMPLETE CBC W/AUTO DIFF WBC: CPT

## 2023-08-28 PROCEDURE — 80061 LIPID PANEL: CPT

## 2023-08-28 PROCEDURE — 84439 ASSAY OF FREE THYROXINE: CPT

## 2023-08-28 PROCEDURE — 80053 COMPREHEN METABOLIC PANEL: CPT

## 2023-08-28 PROCEDURE — 84443 ASSAY THYROID STIM HORMONE: CPT

## 2023-08-28 NOTE — ASSESSMENT & PLAN NOTE
Patient's (Body mass index is 34.76 kg/mý.) indicates that they are obese (BMI >30) with health conditions that include hypertension . Weight is improving with treatment. BMI  is above average; BMI management plan is completed. We discussed low calorie, low carb based diet program, portion control, and increasing exercise.

## 2023-08-28 NOTE — ASSESSMENT & PLAN NOTE
Her thyroid level was drawn today. We will watch for the results and manage according to findings.

## 2023-08-28 NOTE — ASSESSMENT & PLAN NOTE
Hypertension is improving with lifestyle modifications.  Dietary sodium restriction.  Weight loss.  Blood pressure will be reassessed at the next regular appointment.
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
## 2023-08-29 DIAGNOSIS — E03.9 ACQUIRED HYPOTHYROIDISM: Primary | Chronic | ICD-10-CM

## 2023-08-29 RX ORDER — LEVOTHYROXINE SODIUM 0.15 MG/1
150 TABLET ORAL DAILY
Qty: 30 TABLET | Refills: 5 | Status: SHIPPED | OUTPATIENT
Start: 2023-08-29

## 2023-10-04 ENCOUNTER — LAB (OUTPATIENT)
Dept: LAB | Facility: HOSPITAL | Age: 43
End: 2023-10-04
Payer: COMMERCIAL

## 2023-10-04 DIAGNOSIS — E03.9 ACQUIRED HYPOTHYROIDISM: Chronic | ICD-10-CM

## 2023-10-04 LAB
T4 FREE SERPL-MCNC: 1.34 NG/DL (ref 0.93–1.7)
TSH SERPL DL<=0.05 MIU/L-ACNC: 0.02 UIU/ML (ref 0.27–4.2)

## 2023-10-04 PROCEDURE — 36415 COLL VENOUS BLD VENIPUNCTURE: CPT

## 2023-10-04 PROCEDURE — 84443 ASSAY THYROID STIM HORMONE: CPT

## 2023-10-04 PROCEDURE — 84439 ASSAY OF FREE THYROXINE: CPT

## 2023-10-18 ENCOUNTER — OFFICE VISIT (OUTPATIENT)
Dept: FAMILY MEDICINE CLINIC | Facility: CLINIC | Age: 43
End: 2023-10-18
Payer: COMMERCIAL

## 2023-10-18 VITALS
HEIGHT: 63 IN | TEMPERATURE: 98.2 F | HEART RATE: 82 BPM | DIASTOLIC BLOOD PRESSURE: 70 MMHG | SYSTOLIC BLOOD PRESSURE: 116 MMHG | WEIGHT: 202 LBS | OXYGEN SATURATION: 97 % | BODY MASS INDEX: 35.79 KG/M2

## 2023-10-18 DIAGNOSIS — E66.09 CLASS 1 OBESITY DUE TO EXCESS CALORIES WITH SERIOUS COMORBIDITY AND BODY MASS INDEX (BMI) OF 34.0 TO 34.9 IN ADULT: Chronic | ICD-10-CM

## 2023-10-18 DIAGNOSIS — J30.2 SEASONAL ALLERGIES: Chronic | ICD-10-CM

## 2023-10-18 DIAGNOSIS — H65.91 RIGHT OTITIS MEDIA WITH EFFUSION: Primary | ICD-10-CM

## 2023-10-18 PROCEDURE — 99214 OFFICE O/P EST MOD 30 MIN: CPT

## 2023-10-18 PROCEDURE — 3078F DIAST BP <80 MM HG: CPT

## 2023-10-18 PROCEDURE — 3074F SYST BP LT 130 MM HG: CPT

## 2023-10-18 RX ORDER — AMOXICILLIN 500 MG/1
1000 CAPSULE ORAL 2 TIMES DAILY
Qty: 40 CAPSULE | Refills: 0 | Status: SHIPPED | OUTPATIENT
Start: 2023-10-18

## 2023-10-18 NOTE — PROGRESS NOTES
"Chief Complaint  Cough (Has been sick for two weeks now, but was manageable until a couple of days ago. When she breaths out she wheezing. ), Sore Throat, Earache, Shortness of Breath (Feels short of breath when active. ), Wheezing, and Facial Pain (Sinus pressure and pain, had headache last night. )    Subjective        Saray Vang presents to [facility]  History of Present Illness  April presents to the clinic with complaints of a cold that is lingering. She is having sinus congestion and pressure, nasal drainage, cough, sore throat, SOA, earache, nauseas. . She states the symptoms started a couple of weeks ago but starting this past Monday she has been having wheezes. Denies any chest pain, fever, chills, body aches, vomiting, diarrhea. She is also having stress incontinence when she coughs or laughs, she has to wear a liner daily.     Earache   There is pain in both ears. This is a new problem. The current episode started 1 to 4 weeks ago. The problem occurs every few hours. The problem has been waxing and waning. There has been no fever. The pain is at a severity of 2/10. Associated symptoms include abdominal pain, coughing, headaches, hearing loss, neck pain, rhinorrhea and a sore throat. Pertinent negatives include no diarrhea, ear discharge, rash or vomiting.       Objective   Vital Signs:  /70 (BP Location: Left arm, Patient Position: Sitting)   Pulse 82   Temp 98.2 °F (36.8 °C) (Infrared)   Ht 160 cm (62.99\")   Wt 91.6 kg (202 lb)   SpO2 97%   BMI 35.79 kg/m²   Estimated body mass index is 35.79 kg/m² as calculated from the following:    Height as of this encounter: 160 cm (62.99\").    Weight as of this encounter: 91.6 kg (202 lb).               Physical Exam  Constitutional:       Appearance: Normal appearance.   HENT:      Right Ear: A middle ear effusion is present. Tympanic membrane is bulging.      Nose: Nose normal.      Mouth/Throat:      Mouth: Mucous " membranes are moist.   Cardiovascular:      Rate and Rhythm: Normal rate and regular rhythm.      Pulses: Normal pulses.      Heart sounds: Normal heart sounds.   Pulmonary:      Effort: Pulmonary effort is normal.      Breath sounds: Normal breath sounds.   Skin:     General: Skin is warm and dry.   Neurological:      General: No focal deficit present.      Mental Status: She is alert and oriented to person, place, and time.   Psychiatric:         Mood and Affect: Mood normal.         Behavior: Behavior normal.        Result Review :                   Assessment and Plan   Diagnoses and all orders for this visit:    1. Right otitis media with effusion (Primary)  -     amoxicillin (AMOXIL) 500 MG capsule; Take 2 capsules by mouth 2 (Two) Times a Day.  Dispense: 40 capsule; Refill: 0    2. Class 1 obesity due to excess calories with serious comorbidity and body mass index (BMI) of 34.0 to 34.9 in adult  Assessment & Plan:  Patient's (Body mass index is 35.79 kg/m².) indicates that they are obese (BMI >30) with health conditions that include none . Weight is unchanged. BMI  is above average; BMI management plan is completed. We discussed portion control and increasing exercise.       3. Seasonal allergies  Assessment & Plan:  Continue on zyrtec.                Follow Up   Return if symptoms worsen or fail to improve.  Patient was given instructions and counseling regarding her condition or for health maintenance advice. Please see specific information pulled into the AVS if appropriate.

## 2023-10-18 NOTE — ASSESSMENT & PLAN NOTE
Patient's (Body mass index is 35.79 kg/m².) indicates that they are obese (BMI >30) with health conditions that include none . Weight is unchanged. BMI  is above average; BMI management plan is completed. We discussed portion control and increasing exercise.

## 2023-11-20 ENCOUNTER — LAB (OUTPATIENT)
Dept: LAB | Facility: HOSPITAL | Age: 43
End: 2023-11-20
Payer: COMMERCIAL

## 2023-11-20 ENCOUNTER — OFFICE VISIT (OUTPATIENT)
Dept: FAMILY MEDICINE CLINIC | Facility: CLINIC | Age: 43
End: 2023-11-20
Payer: COMMERCIAL

## 2023-11-20 VITALS
HEART RATE: 80 BPM | SYSTOLIC BLOOD PRESSURE: 129 MMHG | DIASTOLIC BLOOD PRESSURE: 84 MMHG | HEIGHT: 63 IN | TEMPERATURE: 98.4 F | BODY MASS INDEX: 36.75 KG/M2 | OXYGEN SATURATION: 98 % | WEIGHT: 207.4 LBS

## 2023-11-20 DIAGNOSIS — E89.0 HISTORY OF THYROIDECTOMY: ICD-10-CM

## 2023-11-20 DIAGNOSIS — R22.1 LUMP IN NECK: Primary | ICD-10-CM

## 2023-11-20 DIAGNOSIS — I10 PRIMARY HYPERTENSION: Chronic | ICD-10-CM

## 2023-11-20 DIAGNOSIS — R22.1 LUMP IN NECK: ICD-10-CM

## 2023-11-20 LAB
BASOPHILS # BLD AUTO: 0.04 10*3/MM3 (ref 0–0.2)
BASOPHILS NFR BLD AUTO: 0.6 % (ref 0–1.5)
DEPRECATED RDW RBC AUTO: 40.2 FL (ref 37–54)
EOSINOPHIL # BLD AUTO: 0.29 10*3/MM3 (ref 0–0.4)
EOSINOPHIL NFR BLD AUTO: 4.1 % (ref 0.3–6.2)
ERYTHROCYTE [DISTWIDTH] IN BLOOD BY AUTOMATED COUNT: 12.9 % (ref 12.3–15.4)
HCT VFR BLD AUTO: 41.6 % (ref 34–46.6)
HGB BLD-MCNC: 14.4 G/DL (ref 12–15.9)
IMM GRANULOCYTES # BLD AUTO: 0.03 10*3/MM3 (ref 0–0.05)
IMM GRANULOCYTES NFR BLD AUTO: 0.4 % (ref 0–0.5)
LYMPHOCYTES # BLD AUTO: 2.04 10*3/MM3 (ref 0.7–3.1)
LYMPHOCYTES NFR BLD AUTO: 28.5 % (ref 19.6–45.3)
MCH RBC QN AUTO: 29.9 PG (ref 26.6–33)
MCHC RBC AUTO-ENTMCNC: 34.6 G/DL (ref 31.5–35.7)
MCV RBC AUTO: 86.5 FL (ref 79–97)
MONOCYTES # BLD AUTO: 0.5 10*3/MM3 (ref 0.1–0.9)
MONOCYTES NFR BLD AUTO: 7 % (ref 5–12)
NEUTROPHILS NFR BLD AUTO: 4.26 10*3/MM3 (ref 1.7–7)
NEUTROPHILS NFR BLD AUTO: 59.4 % (ref 42.7–76)
NRBC BLD AUTO-RTO: 0 /100 WBC (ref 0–0.2)
PLATELET # BLD AUTO: 344 10*3/MM3 (ref 140–450)
PMV BLD AUTO: 10.1 FL (ref 6–12)
RBC # BLD AUTO: 4.81 10*6/MM3 (ref 3.77–5.28)
T4 FREE SERPL-MCNC: 1.51 NG/DL (ref 0.93–1.7)
TSH SERPL DL<=0.05 MIU/L-ACNC: 0.04 UIU/ML (ref 0.27–4.2)
WBC NRBC COR # BLD AUTO: 7.16 10*3/MM3 (ref 3.4–10.8)

## 2023-11-20 PROCEDURE — 85025 COMPLETE CBC W/AUTO DIFF WBC: CPT

## 2023-11-20 PROCEDURE — 3074F SYST BP LT 130 MM HG: CPT

## 2023-11-20 PROCEDURE — 36415 COLL VENOUS BLD VENIPUNCTURE: CPT

## 2023-11-20 PROCEDURE — 99214 OFFICE O/P EST MOD 30 MIN: CPT

## 2023-11-20 PROCEDURE — 84443 ASSAY THYROID STIM HORMONE: CPT

## 2023-11-20 PROCEDURE — 3079F DIAST BP 80-89 MM HG: CPT

## 2023-11-20 PROCEDURE — 84439 ASSAY OF FREE THYROXINE: CPT

## 2023-11-20 NOTE — ASSESSMENT & PLAN NOTE
Hypertension is improving with lifestyle modifications.  Ambulatory blood pressure monitoring.  Blood pressure will be reassessed at the next regular appointment.

## 2023-11-20 NOTE — PROGRESS NOTES
Chief Complaint   Patient presents with    Mass     Has a lump in her neck and thinks it could be a swollen lymph node but has also had her thyroid removed and wants to make sure this isn't a growth from that. Noticed lump on Friday.        Subjective          Saray Vang presents to [facility]    History of Present Illness  April is here to be seen for a lump in her neck that is close to where she had her thyroid removed. She wants to make sure this is a lymph node and not a piece of thyroid that has grown back as she has had thyroid growths before. She has not been sick. The lump is round, hard, and immovable. It is not painful.       Past History:  Medical History: has a past medical history of Abnormal uterine bleeding (AUB), Allergic (Childhood), Anemia (), Arthritis (), Depression (Childhood), Disease of thyroid gland, Headache (Adult), Hypertension (, ), Hyperthyroidism (), Hypothyroidism (), and Visual impairment (Childhood, 2018, ).   Surgical History: has a past surgical history that includes Total thyroidectomy; Hemorrhoid surgery; Laparoscopic tubal ligation; d & c hysteroscopy endometrial ablation (N/A, 2021);  section (); Endometrial ablation (); Eye surgery (?, ?, ); and Tubal ligation ().   Family History: family history includes Alcohol abuse in her maternal grandfather, maternal grandmother, and paternal uncle; Arthritis in her father and mother; Asthma in her daughter and daughter; Cancer in her daughter, maternal grandmother, and mother; Depression in her daughter, maternal grandfather, maternal grandmother, and mother; Diabetes in her paternal grandmother; Drug abuse in her paternal aunt and paternal uncle; Hearing loss in her paternal grandfather; Hyperlipidemia in her father and maternal grandfather; Mental illness in her paternal grandfather; Miscarriages / Stillbirths in her mother; Stroke in  "her maternal grandfather; Thyroid disease in her mother and paternal grandmother.   Social History: reports that she has never smoked. She has never been exposed to tobacco smoke. She has never used smokeless tobacco. She reports that she does not drink alcohol and does not use drugs.  Allergies: Sudafed [pseudoephedrine]  (Not in a hospital admission)       Social History     Socioeconomic History    Marital status:     Number of children: 3   Tobacco Use    Smoking status: Never     Passive exposure: Never    Smokeless tobacco: Never   Vaping Use    Vaping Use: Never used   Substance and Sexual Activity    Alcohol use: Never    Drug use: Never    Sexual activity: Yes     Partners: Male     Birth control/protection: Surgical       There are no preventive care reminders to display for this patient.    Objective     Vital Signs:   /84 (BP Location: Left arm, Patient Position: Sitting)   Pulse 80   Temp 98.4 °F (36.9 °C) (Infrared)   Ht 160 cm (62.99\")   Wt 94.1 kg (207 lb 6.4 oz)   SpO2 98%   BMI 36.75 kg/m²       Physical Exam  Constitutional:       Appearance: Normal appearance.   HENT:      Nose: Nose normal.      Mouth/Throat:      Mouth: Mucous membranes are moist.   Neck:     Cardiovascular:      Rate and Rhythm: Normal rate and regular rhythm.      Pulses: Normal pulses.      Heart sounds: Normal heart sounds.   Pulmonary:      Effort: Pulmonary effort is normal.      Breath sounds: Normal breath sounds.   Skin:     General: Skin is warm and dry.   Neurological:      General: No focal deficit present.      Mental Status: She is alert and oriented to person, place, and time.   Psychiatric:         Mood and Affect: Mood normal.         Behavior: Behavior normal.          Review of Systems   All other systems reviewed and are negative.       Result Review :                 Assessment and Plan    Diagnoses and all orders for this visit:    1. Lump in neck (Primary)  -     CBC w AUTO " Differential; Future  -     TSH+Free T4; Future    2. History of thyroidectomy  -     TSH+Free T4; Future    3. Primary hypertension  Assessment & Plan:  Hypertension is improving with lifestyle modifications.  Ambulatory blood pressure monitoring.  Blood pressure will be reassessed at the next regular appointment.            Pt thought to be clinically stable at this time.    Follow Up   Return in about 4 weeks (around 12/18/2023), or if symptoms worsen or fail to improve.  Patient was given instructions and counseling regarding her condition or for health maintenance advice. Please see specific information pulled into the AVS if appropriate.       Answers submitted by the patient for this visit:  Other (Submitted on 11/20/2023)  Please describe your symptoms.: Small pea sized lump on left side of throat. Clouser to the clavicle then the chin. Could be a lymph node but I’ve never had one swell in that spot before. Johanny had much larger growths on my thyroid but they and my thyroid were removed.  Have you had these symptoms before?: No  How long have you been having these symptoms?: 1-4 days  Please list any medications you are currently taking for this condition.: Thyroid medication. Cetirizine.  Please describe any probable cause for these symptoms. : Unknown.  Primary Reason for Visit (Submitted on 11/20/2023)  What is the primary reason for your visit?: Other

## 2023-12-15 NOTE — TELEPHONE ENCOUNTER
Caller: Taj, April    Relationship: Self    Best call back number: [phone]     Requested Prescriptions:   Requested Prescriptions     Pending Prescriptions Disp Refills    levothyroxine (Synthroid) 150 MCG tablet 30 tablet 5     Sig: Take 1 tablet by mouth Daily.        Pharmacy where request should be sent: [place] [address] [employer] [employer] [facility] - [phone]  - [phone]     Last office visit with prescribing clinician: 8/28/2023   Last telemedicine visit with prescribing clinician: Visit date not found   Next office visit with prescribing clinician: 2/26/2024     Additional details provided by patient: FOUR LEFT ON HAND.     Does the patient have less than a 3 day supply:  [x] Yes  [] No    Would you like a call back once the refill request has been completed: [x] Yes [] No    If the office needs to give you a call back, can they leave a voicemail: [x] Yes [] No    Mir Dill Rep   12/15/23 10:15 EST

## 2023-12-17 RX ORDER — LEVOTHYROXINE SODIUM 0.15 MG/1
150 TABLET ORAL DAILY
Qty: 30 TABLET | Refills: 5 | Status: SHIPPED | OUTPATIENT
Start: 2023-12-17

## 2023-12-18 ENCOUNTER — OFFICE VISIT (OUTPATIENT)
Dept: FAMILY MEDICINE CLINIC | Facility: CLINIC | Age: 43
End: 2023-12-18
Payer: COMMERCIAL

## 2023-12-18 VITALS
DIASTOLIC BLOOD PRESSURE: 82 MMHG | BODY MASS INDEX: 37.07 KG/M2 | OXYGEN SATURATION: 97 % | SYSTOLIC BLOOD PRESSURE: 129 MMHG | HEIGHT: 63 IN | TEMPERATURE: 98.2 F | WEIGHT: 209.2 LBS | HEART RATE: 84 BPM

## 2023-12-18 DIAGNOSIS — J01.90 ACUTE NON-RECURRENT SINUSITIS, UNSPECIFIED LOCATION: ICD-10-CM

## 2023-12-18 DIAGNOSIS — E03.9 ACQUIRED HYPOTHYROIDISM: Chronic | ICD-10-CM

## 2023-12-18 DIAGNOSIS — R22.1 LUMP ON NECK: ICD-10-CM

## 2023-12-18 DIAGNOSIS — H66.91 RIGHT OTITIS MEDIA, UNSPECIFIED OTITIS MEDIA TYPE: Primary | ICD-10-CM

## 2023-12-18 DIAGNOSIS — E66.09 CLASS 1 OBESITY DUE TO EXCESS CALORIES WITH SERIOUS COMORBIDITY AND BODY MASS INDEX (BMI) OF 34.0 TO 34.9 IN ADULT: Chronic | ICD-10-CM

## 2023-12-18 PROCEDURE — 3074F SYST BP LT 130 MM HG: CPT

## 2023-12-18 PROCEDURE — 99214 OFFICE O/P EST MOD 30 MIN: CPT

## 2023-12-18 PROCEDURE — 3079F DIAST BP 80-89 MM HG: CPT

## 2023-12-18 RX ORDER — AMOXICILLIN AND CLAVULANATE POTASSIUM 875; 125 MG/1; MG/1
1 TABLET, FILM COATED ORAL 2 TIMES DAILY
Qty: 14 TABLET | Refills: 0 | Status: SHIPPED | OUTPATIENT
Start: 2023-12-18 | End: 2023-12-25

## 2023-12-20 NOTE — PROGRESS NOTES
Chief Complaint   Patient presents with    Earache     Hard to hear out of right ear and pressure in left ear.     Cough     Cough is worse at night.     Facial Pain     Sinus pressure and pain for a week now. She still also has swollen place in her neck and under her arms when she is taking a shower lymph nodes in arm pits feel achy.        Subjective          Saray Vang presents to [facility]    History of Present Illness  April is here to be seen for a earache on the right side. She states it hard to hear out of that ear right now. She also has pressure in her left ear. Her cough is worse at night. She has sinus pressure and pain associated with this as well. She has a cough that gets worse at night as well.       Past History:  Medical History: has a past medical history of Abnormal uterine bleeding (AUB), Allergic (Childhood), Anemia (), Arthritis (), Depression (Childhood), Disease of thyroid gland, Headache (Adult), Hypertension (, ), Hyperthyroidism (), Hypothyroidism (), and Visual impairment (Childhood, 2018, ).   Surgical History: has a past surgical history that includes Total thyroidectomy; Hemorrhoid surgery; Laparoscopic tubal ligation; d & c hysteroscopy endometrial ablation (N/A, 2021);  section (); Endometrial ablation (); Eye surgery (?, ?, ); and Tubal ligation ().   Family History: family history includes Alcohol abuse in her maternal grandfather, maternal grandmother, and paternal uncle; Arthritis in her father and mother; Asthma in her daughter and daughter; Cancer in her daughter, maternal grandmother, and mother; Depression in her daughter, maternal grandfather, maternal grandmother, and mother; Diabetes in her paternal grandmother; Drug abuse in her paternal aunt and paternal uncle; Hearing loss in her paternal grandfather; Hyperlipidemia in her father and maternal grandfather; Mental  "illness in her paternal grandfather; Miscarriages / Stillbirths in her mother; Stroke in her maternal grandfather; Thyroid disease in her mother and paternal grandmother.   Social History: reports that she has never smoked. She has never been exposed to tobacco smoke. She has never used smokeless tobacco. She reports that she does not drink alcohol and does not use drugs.  Allergies: Sudafed [pseudoephedrine]  (Not in a hospital admission)       Social History     Socioeconomic History    Marital status:     Number of children: 3   Tobacco Use    Smoking status: Never     Passive exposure: Never    Smokeless tobacco: Never   Vaping Use    Vaping Use: Never used   Substance and Sexual Activity    Alcohol use: Never    Drug use: Never    Sexual activity: Yes     Partners: Male     Birth control/protection: Surgical       There are no preventive care reminders to display for this patient.    Objective     Vital Signs:   /82 (BP Location: Left arm, Patient Position: Sitting)   Pulse 84   Temp 98.2 °F (36.8 °C) (Infrared)   Ht 160 cm (62.99\")   Wt 94.9 kg (209 lb 3.2 oz)   SpO2 97%   BMI 37.07 kg/m²       Physical Exam  Constitutional:       Appearance: Normal appearance.   HENT:      Nose: Nose normal.      Mouth/Throat:      Mouth: Mucous membranes are moist.   Cardiovascular:      Rate and Rhythm: Normal rate and regular rhythm.      Pulses: Normal pulses.      Heart sounds: Normal heart sounds.   Pulmonary:      Effort: Pulmonary effort is normal.      Breath sounds: Normal breath sounds.   Lymphadenopathy:      Cervical: Cervical adenopathy present.   Skin:     General: Skin is warm and dry.   Neurological:      General: No focal deficit present.      Mental Status: She is alert and oriented to person, place, and time.   Psychiatric:         Mood and Affect: Mood normal.         Behavior: Behavior normal.          Review of Systems   All other systems reviewed and are negative.       Result Review " :                 Assessment and Plan    Diagnoses and all orders for this visit:    1. Right otitis media, unspecified otitis media type (Primary)  -     amoxicillin-clavulanate (AUGMENTIN) 875-125 MG per tablet; Take 1 tablet by mouth 2 (Two) Times a Day for 7 days.  Dispense: 14 tablet; Refill: 0    2. Acute non-recurrent sinusitis, unspecified location  -     amoxicillin-clavulanate (AUGMENTIN) 875-125 MG per tablet; Take 1 tablet by mouth 2 (Two) Times a Day for 7 days.  Dispense: 14 tablet; Refill: 0    3. Lump on neck  -     US Soft Tissue; Future    4. Class 1 obesity due to excess calories with serious comorbidity and body mass index (BMI) of 34.0 to 34.9 in adult  Assessment & Plan:  Patient's (Body mass index is 37.07 kg/m².) indicates that they are obese (BMI >30) with health conditions that include hypertension . Weight is unchanged. BMI  is above average; BMI management plan is completed. We discussed portion control and increasing exercise.       5. Acquired hypothyroidism  Assessment & Plan:  Continue on synthroid at 150 mcg daily. US of lump felt next to thyroid area ordered            Pt thought to be clinically stable at this time.    Follow Up   Return if symptoms worsen or fail to improve.  Patient was given instructions and counseling regarding her condition or for health maintenance advice. Please see specific information pulled into the AVS if appropriate.

## 2023-12-20 NOTE — ASSESSMENT & PLAN NOTE
Patient's (Body mass index is 37.07 kg/m².) indicates that they are obese (BMI >30) with health conditions that include hypertension . Weight is unchanged. BMI  is above average; BMI management plan is completed. We discussed portion control and increasing exercise.

## 2024-01-04 ENCOUNTER — HOSPITAL ENCOUNTER (OUTPATIENT)
Dept: ULTRASOUND IMAGING | Facility: HOSPITAL | Age: 44
Discharge: HOME | End: 2024-01-04
Payer: COMMERCIAL

## 2024-01-04 DIAGNOSIS — R22.1 LUMP ON NECK: ICD-10-CM

## 2024-01-04 PROCEDURE — 76999 ECHO EXAMINATION PROCEDURE: CPT

## 2024-01-05 DIAGNOSIS — R22.1 NODULE OF NECK: Primary | ICD-10-CM

## 2024-01-05 PROCEDURE — 99214 OFFICE O/P EST MOD 30 MIN: CPT

## 2024-01-05 NOTE — PROGRESS NOTES
Solid nodule in the subcutaneous fat seen on US. Fine nedle aspiration biopsy should be done. Will order that now.

## 2024-01-09 ENCOUNTER — OFFICE VISIT (OUTPATIENT)
Dept: SURGERY | Facility: CLINIC | Age: 44
End: 2024-01-09
Payer: COMMERCIAL

## 2024-01-09 ENCOUNTER — PREP FOR SURGERY (OUTPATIENT)
Dept: OTHER | Facility: HOSPITAL | Age: 44
End: 2024-01-09
Payer: COMMERCIAL

## 2024-01-09 VITALS
WEIGHT: 210 LBS | DIASTOLIC BLOOD PRESSURE: 73 MMHG | RESPIRATION RATE: 16 BRPM | HEART RATE: 81 BPM | BODY MASS INDEX: 37.21 KG/M2 | SYSTOLIC BLOOD PRESSURE: 118 MMHG | HEIGHT: 63 IN

## 2024-01-09 DIAGNOSIS — R22.1 SUBCUTANEOUS NODULE OF NECK: Primary | ICD-10-CM

## 2024-01-09 PROCEDURE — 3074F SYST BP LT 130 MM HG: CPT | Performed by: SURGERY

## 2024-01-09 PROCEDURE — 3078F DIAST BP <80 MM HG: CPT | Performed by: SURGERY

## 2024-01-09 PROCEDURE — 99203 OFFICE O/P NEW LOW 30 MIN: CPT | Performed by: SURGERY

## 2024-01-09 PROCEDURE — 1159F MED LIST DOCD IN RCRD: CPT | Performed by: SURGERY

## 2024-01-09 PROCEDURE — 1160F RVW MEDS BY RX/DR IN RCRD: CPT | Performed by: SURGERY

## 2024-01-09 NOTE — PROGRESS NOTES
"Chief Complaint:  NODULE OF NECK    Primary Care Provider: Whit Holland DO    Referring Provider: Allyssa Melendez APRN    History of Present Illness  April Taj is a 43 y.o. female referred by MAGGIE Madison for a neck nodule.  Patient has a small nodule that she can palpate at the left side of her neck just lateral to the location of a surgical scar from a total thyroidectomy.  Total thyroidectomy was done over 3 years ago and the indication was thyroid goiter.  The area concern was evaluated in ultrasound.  See below for result.    U/S soft tissue neck, 24:  \"Targeted ultrasound examination of the area of concern on the left side of the neck demonstrating tiny solid nodule in subcutaneous fat.  Consider fine needle aspiration biopsy under ultrasound   guidance.\"    Patient is worried the nodule could be something related to cancer and she prefers to just have it removed.    Allergies: Sudafed [pseudoephedrine]    Outpatient Medications Marked as Taking for the 24 encounter (Office Visit) with Jacky Tracy MD   Medication Sig Dispense Refill    cetirizine (zyrTEC) 10 MG tablet Take 1 tablet by mouth Daily. 90 tablet 1    levothyroxine (Synthroid) 150 MCG tablet Take 1 tablet by mouth Daily. 30 tablet 5       Past Medical History:    Abnormal uterine bleeding (AUB)    Allergic    Seasonal    Anemia    Off and on    Arthritis    Hands off and on    Depression    Off and on    Disease of thyroid gland    total thyroidectomy    Headache    Off and on    Hypertension    Pregnancies    Hyperthyroidism    Thyroid removed    Hypothyroidism    Half of thyroid removed    Visual impairment    Glasses, retinal tears        Past Surgical History:     SECTION    Twins    D & C HYSTEROSCOPY ENDOMETRIAL ABLATION    Procedure: DILATATION AND CURETTAGE, HYSTEROSCOPY, NOVASURE ENDOMETRIAL ABLATION;  Surgeon: Praful Topete MD;  Location: [facility] [place];  Service: Gynecology;  Laterality: N/A; " "   ENDOMETRIAL ABLATION    EYE SURGERY    LASIK, Retinal tears    HEMORRHOIDECTOMY    LAPAROSCOPIC TUBAL LIGATION    TOTAL THYROIDECTOMY    TUBAL ABDOMINAL LIGATION       Family History:   Family History   Problem Relation Age of Onset    Alcohol abuse Paternal Uncle     Drug abuse Paternal Uncle     Alcohol abuse Maternal Grandmother     Cancer Maternal Grandmother     Depression Maternal Grandmother     Alcohol abuse Maternal Grandfather     Depression Maternal Grandfather     Hyperlipidemia Maternal Grandfather     Stroke Maternal Grandfather     Arthritis Mother     Cancer Mother     Depression Mother     Thyroid disease Mother     Miscarriages / Stillbirths Mother     Arthritis Father     Hyperlipidemia Father     Asthma Daughter     Depression Daughter     Asthma Daughter     Cancer Daughter     Diabetes Paternal Grandmother     Thyroid disease Paternal Grandmother     Hearing loss Paternal Grandfather     Mental illness Paternal Grandfather         Alzheimer’s    Drug abuse Paternal Aunt         Social History:  Social History     Tobacco Use    Smoking status: Never     Passive exposure: Never    Smokeless tobacco: Never   Substance Use Topics    Alcohol use: Never       Objective     Vital Signs:  /73 (BP Location: Right arm, Patient Position: Sitting, Cuff Size: Adult)   Pulse 81   Resp 16   Ht 160 cm (63\")   Wt 95.3 kg (210 lb)   BMI 37.20 kg/m²   Constitutional:  present, alert, no acute distress, reliable historian  Neck:  thyroidectomy scar.  Just lateral to the left side of the scar, there is a palpable subcutaneous nodule.  The nodule is firm, mobile, nontender, and about one cm in diameter  Respiratory:  breathing not labored, respiratory effort appears normal  Cardiovascular:  heart regular rate  Musculoskeletal: moving all extremities symmetrically and purposefully  Neurologic:  no obvious motor or sensory deficits, speech clear  Psychiatric:  judgment and insight " intact        Assessment:  Subcutaneous nodule of neck    Plan:  Excision of subcutaneous nodule from left side of neck    Discussion: Indications, options, risks, benefits, and expected outcomes of planned surgery were discussed with the patient and she agrees to proceed.    Jacky Tracy MD  01/09/2024    Electronically signed by Jacky Tracy MD, 01/09/24, 11:29 AM EST.

## 2024-01-09 NOTE — H&P (VIEW-ONLY)
"Chief Complaint:  NODULE OF NECK    Primary Care Provider: Whit Holland DO    Referring Provider: Allyssa Melendez APRN    History of Present Illness  April Taj is a 43 y.o. female referred by MAGGIE Madison for a neck nodule.  Patient has a small nodule that she can palpate at the left side of her neck just lateral to the location of a surgical scar from a total thyroidectomy.  Total thyroidectomy was done over 3 years ago and the indication was thyroid goiter.  The area concern was evaluated in ultrasound.  See below for result.    U/S soft tissue neck, 24:  \"Targeted ultrasound examination of the area of concern on the left side of the neck demonstrating tiny solid nodule in subcutaneous fat.  Consider fine needle aspiration biopsy under ultrasound   guidance.\"    Patient is worried the nodule could be something related to cancer and she prefers to just have it removed.    Allergies: Sudafed [pseudoephedrine]    Outpatient Medications Marked as Taking for the 24 encounter (Office Visit) with Jacky Tracy MD   Medication Sig Dispense Refill    cetirizine (zyrTEC) 10 MG tablet Take 1 tablet by mouth Daily. 90 tablet 1    levothyroxine (Synthroid) 150 MCG tablet Take 1 tablet by mouth Daily. 30 tablet 5       Past Medical History:    Abnormal uterine bleeding (AUB)    Allergic    Seasonal    Anemia    Off and on    Arthritis    Hands off and on    Depression    Off and on    Disease of thyroid gland    total thyroidectomy    Headache    Off and on    Hypertension    Pregnancies    Hyperthyroidism    Thyroid removed    Hypothyroidism    Half of thyroid removed    Visual impairment    Glasses, retinal tears        Past Surgical History:     SECTION    Twins    D & C HYSTEROSCOPY ENDOMETRIAL ABLATION    Procedure: DILATATION AND CURETTAGE, HYSTEROSCOPY, NOVASURE ENDOMETRIAL ABLATION;  Surgeon: Praful Topete MD;  [place] [facility] [place] [facility] [place]  Service: [place]necology;  Laterality: N/A; " "   ENDOMETRIAL ABLATION    EYE SURGERY    LASIK, Retinal tears    HEMORRHOIDECTOMY    LAPAROSCOPIC TUBAL LIGATION    TOTAL THYROIDECTOMY    TUBAL ABDOMINAL LIGATION       Family History:   Family History   Problem Relation Age of Onset    Alcohol abuse Paternal Uncle     Drug abuse Paternal Uncle     Alcohol abuse Maternal Grandmother     Cancer Maternal Grandmother     Depression Maternal Grandmother     Alcohol abuse Maternal Grandfather     Depression Maternal Grandfather     Hyperlipidemia Maternal Grandfather     Stroke Maternal Grandfather     Arthritis Mother     Cancer Mother     Depression Mother     Thyroid disease Mother     Miscarriages / Stillbirths Mother     Arthritis Father     Hyperlipidemia Father     Asthma Daughter     Depression Daughter     Asthma Daughter     Cancer Daughter     Diabetes Paternal Grandmother     Thyroid disease Paternal Grandmother     Hearing loss Paternal Grandfather     Mental illness Paternal Grandfather         Alzheimer’s    Drug abuse Paternal Aunt         Social History:  Social History     Tobacco Use    Smoking status: Never     Passive exposure: Never    Smokeless tobacco: Never   Substance Use Topics    Alcohol use: Never       Objective     Vital Signs:  /73 (BP Location: Right arm, Patient Position: Sitting, Cuff Size: Adult)   Pulse 81   Resp 16   Ht 160 cm (63\")   Wt 95.3 kg (210 lb)   BMI 37.20 kg/m²   Constitutional:  present, alert, no acute distress, reliable historian  Neck:  thyroidectomy scar.  Just lateral to the left side of the scar, there is a palpable subcutaneous nodule.  The nodule is firm, mobile, nontender, and about one cm in diameter  Respiratory:  breathing not labored, respiratory effort appears normal  Cardiovascular:  heart regular rate  Musculoskeletal: moving all extremities symmetrically and purposefully  Neurologic:  no obvious motor or sensory deficits, speech clear  Psychiatric:  judgment and insight " intact        Assessment:  Subcutaneous nodule of neck    Plan:  Excision of subcutaneous nodule from left side of neck    Discussion: Indications, options, risks, benefits, and expected outcomes of planned surgery were discussed with the patient and she agrees to proceed.    Jacky Tracy MD  01/09/2024    Electronically signed by Jacky Tracy MD, 01/09/24, 11:29 AM EST.

## 2024-02-02 RX ORDER — VITAMIN E 268 MG
400 CAPSULE ORAL DAILY
COMMUNITY

## 2024-02-02 NOTE — PRE-PROCEDURE INSTRUCTIONS
IMPORTANT INSTRUCTIONS - PRE-ADMISSION TESTING  DO NOT EAT OR CHEW anything after midnight the night before your procedure.    You may have SIPS NO RED CLEAR liquids up to __2____ hours prior to ARRIVAL time.   Take the following medications the morning of your procedure with JUST A SIP OF WATER:  __LEVOTHYROXINE, ZYRTEC IF NEEDED_____________________________________________________________________________________________________________________________________________________________________________________    DO NOT BRING your medications to the hospital with you, UNLESS something has changed since your PRE-Admission Testing appointment.  Hold all vitamins, supplements, and NSAIDS (Non- steroidal anti-inflammatory meds) for one week prior to surgery (you MAY take Tylenol or Acetaminophen).  If you are diabetic, check your blood sugar the morning of your procedure. If it is less than 70 or if you are feeling symptomatic, call the following number for further instructions: [phone]  Use your inhalers/nebulizers as usual, the morning of your procedure. BRING YOUR INHALERS with you.   Bring your CPAP or BIPAP to hospital, ONLY IF YOU WILL BE SPENDING THE NIGHT.   Make sure you have a ride home and have someone who will stay with you the day of your procedure after you go home.  If you have any questions, please call your Pre-Admission Testing Nurse, ____CHRYSTAL____________ at [phone] _[phone]___________.   Per anesthesia request, do not smoke for 24 hours before your procedure or as instructed by your surgeon.    BATHING INSTRUCTIONS GIVEN. NO JEWELRY DAY OF PROCEDURE. NO NAIL POLISH UPPER OR LOWER EXTREMITIES.  ENTRANCE C [facility]  CASH, CHECK, OR CARD FOR MEDS TO BED IF INDICATED AT DISCHARGE

## 2024-02-05 ENCOUNTER — ANESTHESIA EVENT (OUTPATIENT)
Dept: PERIOP | Facility: HOSPITAL | Age: 44
End: 2024-02-05
Payer: COMMERCIAL

## 2024-02-05 ENCOUNTER — ANESTHESIA (OUTPATIENT)
Dept: PERIOP | Facility: HOSPITAL | Age: 44
End: 2024-02-05
Payer: COMMERCIAL

## 2024-02-05 ENCOUNTER — HOSPITAL ENCOUNTER (OUTPATIENT)
Facility: HOSPITAL | Age: 44
Setting detail: HOSPITAL OUTPATIENT SURGERY
Discharge: HOME | End: 2024-02-05
Attending: SURGERY | Admitting: SURGERY
Payer: COMMERCIAL

## 2024-02-05 VITALS
DIASTOLIC BLOOD PRESSURE: 95 MMHG | RESPIRATION RATE: 19 BRPM | SYSTOLIC BLOOD PRESSURE: 142 MMHG | HEIGHT: 63 IN | BODY MASS INDEX: 37.38 KG/M2 | OXYGEN SATURATION: 92 % | WEIGHT: 210.98 LBS | TEMPERATURE: 97 F | HEART RATE: 83 BPM

## 2024-02-05 DIAGNOSIS — R22.1 SUBCUTANEOUS NODULE OF NECK: ICD-10-CM

## 2024-02-05 PROCEDURE — 25010000002 BUPIVACAINE (PF) 0.25 % SOLUTION: Performed by: SURGERY

## 2024-02-05 PROCEDURE — 25010000002 ONDANSETRON PER 1 MG

## 2024-02-05 PROCEDURE — 25010000002 MIDAZOLAM PER 1MG: Performed by: ANESTHESIOLOGY

## 2024-02-05 PROCEDURE — 25010000002 DEXAMETHASONE PER 1 MG

## 2024-02-05 PROCEDURE — 25010000002 FENTANYL CITRATE (PF) 50 MCG/ML SOLUTION

## 2024-02-05 PROCEDURE — 25010000002 MEPERIDINE PER 100 MG

## 2024-02-05 PROCEDURE — 25810000003 LACTATED RINGERS PER 1000 ML: Performed by: ANESTHESIOLOGY

## 2024-02-05 PROCEDURE — 25010000002 PROPOFOL 10 MG/ML EMULSION

## 2024-02-05 PROCEDURE — 88304 TISSUE EXAM BY PATHOLOGIST: CPT | Performed by: SURGERY

## 2024-02-05 PROCEDURE — 11421 EXC H-F-NK-SP B9+MARG 0.6-1: CPT | Performed by: SURGERY

## 2024-02-05 RX ORDER — ONDANSETRON 2 MG/ML
INJECTION INTRAMUSCULAR; INTRAVENOUS AS NEEDED
Status: DISCONTINUED | OUTPATIENT
Start: 2024-02-05 | End: 2024-02-05 | Stop reason: SURG

## 2024-02-05 RX ORDER — DEXAMETHASONE SODIUM PHOSPHATE 4 MG/ML
INJECTION, SOLUTION INTRA-ARTICULAR; INTRALESIONAL; INTRAMUSCULAR; INTRAVENOUS; SOFT TISSUE AS NEEDED
Status: DISCONTINUED | OUTPATIENT
Start: 2024-02-05 | End: 2024-02-05 | Stop reason: SURG

## 2024-02-05 RX ORDER — PROMETHAZINE HYDROCHLORIDE 12.5 MG/1
25 TABLET ORAL ONCE AS NEEDED
Status: DISCONTINUED | OUTPATIENT
Start: 2024-02-05 | End: 2024-02-05 | Stop reason: HOSPADM

## 2024-02-05 RX ORDER — SODIUM CHLORIDE, SODIUM LACTATE, POTASSIUM CHLORIDE, CALCIUM CHLORIDE 600; 310; 30; 20 MG/100ML; MG/100ML; MG/100ML; MG/100ML
9 INJECTION, SOLUTION INTRAVENOUS CONTINUOUS PRN
Status: DISCONTINUED | OUTPATIENT
Start: 2024-02-05 | End: 2024-02-05 | Stop reason: HOSPADM

## 2024-02-05 RX ORDER — ONDANSETRON 2 MG/ML
4 INJECTION INTRAMUSCULAR; INTRAVENOUS ONCE AS NEEDED
Status: DISCONTINUED | OUTPATIENT
Start: 2024-02-05 | End: 2024-02-05 | Stop reason: HOSPADM

## 2024-02-05 RX ORDER — BUPIVACAINE HYDROCHLORIDE 2.5 MG/ML
INJECTION, SOLUTION EPIDURAL; INFILTRATION; INTRACAUDAL AS NEEDED
Status: DISCONTINUED | OUTPATIENT
Start: 2024-02-05 | End: 2024-02-05 | Stop reason: HOSPADM

## 2024-02-05 RX ORDER — MIDAZOLAM HYDROCHLORIDE 2 MG/2ML
2 INJECTION, SOLUTION INTRAMUSCULAR; INTRAVENOUS ONCE
Status: COMPLETED | OUTPATIENT
Start: 2024-02-05 | End: 2024-02-05

## 2024-02-05 RX ORDER — LIDOCAINE HYDROCHLORIDE 20 MG/ML
INJECTION, SOLUTION EPIDURAL; INFILTRATION; INTRACAUDAL; PERINEURAL AS NEEDED
Status: DISCONTINUED | OUTPATIENT
Start: 2024-02-05 | End: 2024-02-05 | Stop reason: SURG

## 2024-02-05 RX ORDER — SODIUM CHLORIDE 9 MG/ML
40 INJECTION, SOLUTION INTRAVENOUS AS NEEDED
Status: DISCONTINUED | OUTPATIENT
Start: 2024-02-05 | End: 2024-02-05 | Stop reason: HOSPADM

## 2024-02-05 RX ORDER — MEPERIDINE HYDROCHLORIDE 25 MG/ML
12.5 INJECTION INTRAMUSCULAR; INTRAVENOUS; SUBCUTANEOUS
Status: DISCONTINUED | OUTPATIENT
Start: 2024-02-05 | End: 2024-02-05 | Stop reason: HOSPADM

## 2024-02-05 RX ORDER — PROMETHAZINE HYDROCHLORIDE 25 MG/1
25 SUPPOSITORY RECTAL ONCE AS NEEDED
Status: DISCONTINUED | OUTPATIENT
Start: 2024-02-05 | End: 2024-02-05 | Stop reason: HOSPADM

## 2024-02-05 RX ORDER — ACETAMINOPHEN 500 MG
1000 TABLET ORAL ONCE
Status: COMPLETED | OUTPATIENT
Start: 2024-02-05 | End: 2024-02-05

## 2024-02-05 RX ORDER — PROPOFOL 10 MG/ML
VIAL (ML) INTRAVENOUS AS NEEDED
Status: DISCONTINUED | OUTPATIENT
Start: 2024-02-05 | End: 2024-02-05 | Stop reason: SURG

## 2024-02-05 RX ORDER — OXYCODONE HYDROCHLORIDE 5 MG/1
5 TABLET ORAL
Status: DISCONTINUED | OUTPATIENT
Start: 2024-02-05 | End: 2024-02-05 | Stop reason: HOSPADM

## 2024-02-05 RX ORDER — FENTANYL CITRATE 50 UG/ML
INJECTION, SOLUTION INTRAMUSCULAR; INTRAVENOUS AS NEEDED
Status: DISCONTINUED | OUTPATIENT
Start: 2024-02-05 | End: 2024-02-05 | Stop reason: SURG

## 2024-02-05 RX ADMIN — DEXAMETHASONE SODIUM PHOSPHATE 4 MG: 4 INJECTION, SOLUTION INTRAMUSCULAR; INTRAVENOUS at 11:50

## 2024-02-05 RX ADMIN — ACETAMINOPHEN 1000 MG: 500 TABLET ORAL at 08:51

## 2024-02-05 RX ADMIN — ONDANSETRON 4 MG: 2 INJECTION INTRAMUSCULAR; INTRAVENOUS at 12:01

## 2024-02-05 RX ADMIN — SODIUM CHLORIDE, POTASSIUM CHLORIDE, SODIUM LACTATE AND CALCIUM CHLORIDE 9 ML/HR: 600; 310; 30; 20 INJECTION, SOLUTION INTRAVENOUS at 08:51

## 2024-02-05 RX ADMIN — MEPERIDINE HYDROCHLORIDE 12.5 MG: 25 INJECTION INTRAMUSCULAR; INTRAVENOUS; SUBCUTANEOUS at 12:23

## 2024-02-05 RX ADMIN — SODIUM CHLORIDE, POTASSIUM CHLORIDE, SODIUM LACTATE AND CALCIUM CHLORIDE: 600; 310; 30; 20 INJECTION, SOLUTION INTRAVENOUS at 12:05

## 2024-02-05 RX ADMIN — FENTANYL CITRATE 25 MCG: 50 INJECTION, SOLUTION INTRAMUSCULAR; INTRAVENOUS at 11:42

## 2024-02-05 RX ADMIN — MIDAZOLAM HYDROCHLORIDE 2 MG: 1 INJECTION, SOLUTION INTRAMUSCULAR; INTRAVENOUS at 11:13

## 2024-02-05 RX ADMIN — LIDOCAINE HYDROCHLORIDE 100 MG: 20 INJECTION, SOLUTION EPIDURAL; INFILTRATION; INTRACAUDAL; PERINEURAL at 11:42

## 2024-02-05 RX ADMIN — PROPOFOL 180 MG: 10 INJECTION, EMULSION INTRAVENOUS at 11:42

## 2024-02-05 RX ADMIN — FENTANYL CITRATE 25 MCG: 50 INJECTION, SOLUTION INTRAMUSCULAR; INTRAVENOUS at 11:58

## 2024-02-05 NOTE — DISCHARGE INSTRUCTIONS
Dr. Tracy's Instructions          DIET  Resume your regular diet.     ACTIVITY & RETURN TO WORK  You are excused from work for one week but may return anytime before then should you feel able to do so.  No specific activity restrictions.     WOUND CARE & SHOWERING/BATHING  Your incision is covered with a plastic type material that will flake off on its own in the next few weeks.  If the plastic type material has not flaked off on its own by 2 weeks after your surgery then use tweezers to pull it off.  You have sutures in your incision but they are placed below the level of the skin and they will slowly dissolve (they do not need to be removed).  The skin around your incision will likely have some bruising.  This is normal.  You can shower beginning tomorrow but wait two weeks after your surgery before taking any tub baths.     HOME MEDICATIONS  Resume all of your normal home medications.     PAIN CONTROL  Take Tylenol or Motrin for any pain.     FOLLOW-UP VISIT & QUESTIONS/CONCERNS  Call Dr. Patel office at [phone] and schedule a follow-up appointment for about three to four weeks after your surgery date.  Should you have any questions or concerns, please call Dr. Patel office.                DISCHARGE INSTRUCTIONS  SURGICAL / AMBULATORY  PROCEDURES      For your surgery you had:  General anesthesia (you may have a sore throat for the first 24 hours)  IV sedation.  Local anesthesia  Monitored anesthesia Care  You received a medicated patch for nausea prevention today (behind your ear). It is recommended that you remove it 24-48 hours post-operatively. It must be removed within 72 hours.   You have received an anesthesia medication today that can cause hormonal forms of birth control to be ineffective. You should use a different form of birth control (to prevent pregnancy) for 7 days.   You may experience dizziness, drowsiness, or light-headedness for several hours following surgery/procedure.  Do not  stay alone today or tonight.  Limit your activity for 24 hours.  Resume your diet slowly.  Follow whatever special dietary instructions you may have been given by your doctor.  You should not drive or operate machinery, drink alcohol, or sign legally binding documents for 24 hours or while you are taking pain medication.  Last dose of pain medication was given at:   TYLENOL AT 0900AM MAY START TYLENOL AFTER 100PM , ALTERNATE WITH MOTRIN AS NEEDED FOR PAIN .  NOTIFY YOUR DOCTOR IF YOU EXPERIENCE ANY OF THE FOLLOWING:  Temperature greater than 101 degrees Fahrenheit  Shaking Chills  Redness or excessive drainage from incision  Nausea, vomiting and/or pain that is not controlled by prescribed medications  Increase in bleeding or bleeding that is excessive  Unable to urinate in 6 hours after surgery  If unable to reach your doctor, please go to the closest Emergency Room  You may begin dressing changes:     [] in 24 hours   [] in 48 hours   [x] Other:  INCISION SEALED WITH GLUE .MAY FLAKE OFF IN NEXT 7-10DAYS     You may shower    .  Apply an ice pack 24-48 hours.  Medications per physician instructions as indicated on Discharge Medication Information Sheet.  You should see Dr. VALENZUELA  for follow-up care   on   .  Phone number: [phone]      SPECIAL INSTRUCTIONS:

## 2024-02-05 NOTE — OP NOTE
Operative Report    Patient Name:  Saray Vang  YOB: 1980    Date of Surgery:  2/5/2024     Pre-op Diagnosis:   Subcutaneous nodule of neck [R22.1]       Post-op Diagnosis:   Post-Op Diagnosis Codes:     * Subcutaneous nodule of neck [R22.1]    Procedure:  Excision of subcutaneous nodule from neck    Staff:  Surgeon(s):  Jacky Tracy MD    Assistant: Tristin Rojas CSA    Anesthesia: General    Estimated Blood Loss: minimal    Complications:  None    Drains:  None    Packing:  None    Implants:    Nothing was implanted during the procedure    Specimen:          Specimens       ID Source Type Tests Collected By Collected At Frozen?    A Neck Tissue TISSUE PATHOLOGY EXAM   Jacky Tracy MD 2/5/24 1200     Description: LEFT NECK NODULE          Indications:  See my preop H&P note.     Findings:   Approximately 8 mm diameter subcutaneous nodule removed from the superficial subcutaneous tissue of the left side of the neck.        Description of Procedure: The patient was taken the operating placed supine on the procedure table.  Timeout was performed.  Anesthesia was administered.  Patient's neck was prepped and draped in usual fashion.   The target lesion was palpated.  Local anesthesia was injected to the skin and subcutaneous tissue.  A scalpel was used to make an incision through the skin and deepened into the superficial subcutaneous tissue where a circumscribed nodule was identified.  The nodule was circumferentially dissected and removed.  The nodule was about 8 mm in diameter.  Excised tissue was sent to pathology.  Adequate hemostasis.  Wound was closed with buried absorbable suture followed by skin adhesive.  No complications.  Patient was transported to the recovery area in stable condition.    Assistant: Tristin Rojas CSA  was responsible for performing the following activities:  Retraction, Closing, and Placing Dressing and his skilled assistance was necessary for the success of  this case.    Jacky Tracy MD     Date: 2/5/2024  Time: 12:16 EST    Electronically signed by Jacky Tracy MD, 02/05/24, 12:16 PM EST.

## 2024-02-05 NOTE — ANESTHESIA PREPROCEDURE EVALUATION
Anesthesia Evaluation     Patient summary reviewed and Nursing notes reviewed   no history of anesthetic complications:   NPO Solid Status: > 8 hours  NPO Liquid Status: > 2 hours           Airway   Mallampati: II  TM distance: >3 FB  Neck ROM: full  No difficulty expected  Dental - normal exam     Pulmonary - negative pulmonary ROS and normal exam    breath sounds clear to auscultation  Cardiovascular - normal exam  Exercise tolerance: excellent (>7 METS)    Rhythm: regular  Rate: normal    (+) hypertension, hyperlipidemia      Neuro/Psych  (+) headaches, psychiatric history  GI/Hepatic/Renal/Endo    (+) morbid obesity, thyroid problem     Musculoskeletal     Abdominal    Substance History - negative use     OB/GYN negative ob/gyn ROS         Other   arthritis,   Blood dyscrasia: .  ROS/Med Hx Other: Pt hypothyroid pt takes replacement thyroid for replacement                  Anesthesia Plan    ASA 3     general and MAC     intravenous induction     Anesthetic plan, risks, benefits, and alternatives have been provided, discussed and informed consent has been obtained with: patient and spouse/significant other.  Pre-procedure education provided

## 2024-02-05 NOTE — ANESTHESIA POSTPROCEDURE EVALUATION
Patient: April Taj    Procedure Summary       Date: 02/05/24 Room / Location: [facility]  /  [facility]    Anesthesia Start: 1137 Anesthesia Stop: 1220    Procedure: Excision of subcutaneous nodule from neck (Left) Diagnosis:       Subcutaneous nodule of neck      (Subcutaneous nodule of neck [R22.1])    Surgeons: Jacky Tracy MD Provider: Richard Palma MD    Anesthesia Type: general, MAC ASA Status: 3            Anesthesia Type: general, MAC    Vitals  Vitals Value Taken Time   /92 02/05/24 1245   Temp 36.6 °C (97.9 °F) 02/05/24 1219   Pulse 76 02/05/24 1248   Resp 12 02/05/24 1219   SpO2 95 % 02/05/24 1248   Vitals shown include unfiled device data.        Post Anesthesia Care and Evaluation    Patient location during evaluation: bedside  Patient participation: complete - patient participated  Level of consciousness: awake  Pain management: adequate    Airway patency: patent  PONV Status: none  Cardiovascular status: acceptable  Respiratory status: acceptable  Hydration status: acceptable    Comments: An Anesthesiologist personally participated in the most demanding procedures (including induction and emergence if applicable) in the anesthesia plan, monitored the course of anesthesia administration at frequent intervals and remained physically present and available for immediate diagnosis and treatment of emergencies.

## 2024-02-23 ENCOUNTER — LAB (OUTPATIENT)
Dept: LAB | Facility: HOSPITAL | Age: 44
End: 2024-02-23
Payer: COMMERCIAL

## 2024-02-23 DIAGNOSIS — D50.9 IRON DEFICIENCY ANEMIA, UNSPECIFIED IRON DEFICIENCY ANEMIA TYPE: ICD-10-CM

## 2024-02-23 DIAGNOSIS — E03.9 ACQUIRED HYPOTHYROIDISM: Primary | Chronic | ICD-10-CM

## 2024-02-23 DIAGNOSIS — E03.9 ACQUIRED HYPOTHYROIDISM: Chronic | ICD-10-CM

## 2024-02-23 DIAGNOSIS — D50.9 IRON DEFICIENCY ANEMIA, UNSPECIFIED IRON DEFICIENCY ANEMIA TYPE: Chronic | ICD-10-CM

## 2024-02-23 LAB
BASOPHILS # BLD AUTO: 0.04 10*3/MM3 (ref 0–0.2)
BASOPHILS NFR BLD AUTO: 0.6 % (ref 0–1.5)
DEPRECATED RDW RBC AUTO: 37.7 FL (ref 37–54)
EOSINOPHIL # BLD AUTO: 0.32 10*3/MM3 (ref 0–0.4)
EOSINOPHIL NFR BLD AUTO: 5.1 % (ref 0.3–6.2)
ERYTHROCYTE [DISTWIDTH] IN BLOOD BY AUTOMATED COUNT: 12.1 % (ref 12.3–15.4)
FERRITIN SERPL-MCNC: 86.4 NG/ML (ref 13–150)
HCT VFR BLD AUTO: 41.9 % (ref 34–46.6)
HGB BLD-MCNC: 14.1 G/DL (ref 12–15.9)
IMM GRANULOCYTES # BLD AUTO: 0.02 10*3/MM3 (ref 0–0.05)
IMM GRANULOCYTES NFR BLD AUTO: 0.3 % (ref 0–0.5)
IRON 24H UR-MRATE: 71 MCG/DL (ref 37–145)
IRON SATN MFR SERPL: 19 % (ref 20–50)
LYMPHOCYTES # BLD AUTO: 2.02 10*3/MM3 (ref 0.7–3.1)
LYMPHOCYTES NFR BLD AUTO: 32.2 % (ref 19.6–45.3)
MCH RBC QN AUTO: 28.8 PG (ref 26.6–33)
MCHC RBC AUTO-ENTMCNC: 33.7 G/DL (ref 31.5–35.7)
MCV RBC AUTO: 85.5 FL (ref 79–97)
MONOCYTES # BLD AUTO: 0.51 10*3/MM3 (ref 0.1–0.9)
MONOCYTES NFR BLD AUTO: 8.1 % (ref 5–12)
NEUTROPHILS NFR BLD AUTO: 3.37 10*3/MM3 (ref 1.7–7)
NEUTROPHILS NFR BLD AUTO: 53.7 % (ref 42.7–76)
NRBC BLD AUTO-RTO: 0 /100 WBC (ref 0–0.2)
PLATELET # BLD AUTO: 344 10*3/MM3 (ref 140–450)
PMV BLD AUTO: 10.2 FL (ref 6–12)
RBC # BLD AUTO: 4.9 10*6/MM3 (ref 3.77–5.28)
T4 FREE SERPL-MCNC: 1.97 NG/DL (ref 0.93–1.7)
TIBC SERPL-MCNC: 367 MCG/DL (ref 298–536)
TRANSFERRIN SERPL-MCNC: 246 MG/DL (ref 200–360)
TSH SERPL DL<=0.05 MIU/L-ACNC: <0.005 UIU/ML (ref 0.27–4.2)
WBC NRBC COR # BLD AUTO: 6.28 10*3/MM3 (ref 3.4–10.8)

## 2024-02-23 PROCEDURE — 83540 ASSAY OF IRON: CPT

## 2024-02-23 PROCEDURE — 84443 ASSAY THYROID STIM HORMONE: CPT

## 2024-02-23 PROCEDURE — 84439 ASSAY OF FREE THYROXINE: CPT

## 2024-02-23 PROCEDURE — 82728 ASSAY OF FERRITIN: CPT

## 2024-02-23 PROCEDURE — 85025 COMPLETE CBC W/AUTO DIFF WBC: CPT

## 2024-02-23 PROCEDURE — 84466 ASSAY OF TRANSFERRIN: CPT

## 2024-02-23 PROCEDURE — 36415 COLL VENOUS BLD VENIPUNCTURE: CPT

## 2024-02-24 DIAGNOSIS — E03.9 ACQUIRED HYPOTHYROIDISM: Primary | Chronic | ICD-10-CM

## 2024-02-24 RX ORDER — LEVOTHYROXINE SODIUM 137 UG/1
137 TABLET ORAL DAILY
Qty: 30 TABLET | Refills: 5 | Status: SHIPPED | OUTPATIENT
Start: 2024-02-24

## 2024-02-26 ENCOUNTER — OFFICE VISIT (OUTPATIENT)
Dept: FAMILY MEDICINE CLINIC | Facility: CLINIC | Age: 44
End: 2024-02-26
Payer: COMMERCIAL

## 2024-02-26 VITALS
HEART RATE: 88 BPM | RESPIRATION RATE: 18 BRPM | SYSTOLIC BLOOD PRESSURE: 124 MMHG | DIASTOLIC BLOOD PRESSURE: 74 MMHG | BODY MASS INDEX: 37.6 KG/M2 | OXYGEN SATURATION: 98 % | WEIGHT: 212.2 LBS | TEMPERATURE: 98.7 F | HEIGHT: 63 IN

## 2024-02-26 DIAGNOSIS — I10 PRIMARY HYPERTENSION: Chronic | ICD-10-CM

## 2024-02-26 DIAGNOSIS — Z00.00 ANNUAL PHYSICAL EXAM: Primary | ICD-10-CM

## 2024-02-26 DIAGNOSIS — E66.01 CLASS 2 SEVERE OBESITY DUE TO EXCESS CALORIES WITH SERIOUS COMORBIDITY AND BODY MASS INDEX (BMI) OF 37.0 TO 37.9 IN ADULT: ICD-10-CM

## 2024-02-26 DIAGNOSIS — E78.2 MIXED HYPERLIPIDEMIA: Chronic | ICD-10-CM

## 2024-02-26 DIAGNOSIS — E03.9 ACQUIRED HYPOTHYROIDISM: Chronic | ICD-10-CM

## 2024-02-26 PROBLEM — E66.812 CLASS 2 OBESITY WITH ALVEOLAR HYPOVENTILATION AND BODY MASS INDEX (BMI) OF 37.0 TO 37.9 IN ADULT: Chronic | Status: ACTIVE | Noted: 2023-08-28

## 2024-02-26 PROBLEM — E66.2 CLASS 2 OBESITY WITH ALVEOLAR HYPOVENTILATION AND BODY MASS INDEX (BMI) OF 37.0 TO 37.9 IN ADULT: Chronic | Status: ACTIVE | Noted: 2023-08-28

## 2024-02-26 PROBLEM — E66.812 CLASS 2 OBESITY WITH ALVEOLAR HYPOVENTILATION AND BODY MASS INDEX (BMI) OF 37.0 TO 37.9 IN ADULT: Status: ACTIVE | Noted: 2023-08-28

## 2024-02-26 PROBLEM — E66.09 CLASS 1 OBESITY DUE TO EXCESS CALORIES WITH SERIOUS COMORBIDITY AND BODY MASS INDEX (BMI) OF 34.0 TO 34.9 IN ADULT: Chronic | Status: RESOLVED | Noted: 2023-08-28 | Resolved: 2024-02-26

## 2024-02-26 PROBLEM — S06.0X0A CONCUSSION WITH NO LOSS OF CONSCIOUSNESS: Status: RESOLVED | Noted: 2023-06-27 | Resolved: 2024-02-26

## 2024-02-26 PROBLEM — E66.811 CLASS 1 OBESITY DUE TO EXCESS CALORIES WITH SERIOUS COMORBIDITY AND BODY MASS INDEX (BMI) OF 34.0 TO 34.9 IN ADULT: Chronic | Status: RESOLVED | Noted: 2023-08-28 | Resolved: 2024-02-26

## 2024-02-26 PROBLEM — E66.2 CLASS 2 OBESITY WITH ALVEOLAR HYPOVENTILATION AND BODY MASS INDEX (BMI) OF 37.0 TO 37.9 IN ADULT: Status: ACTIVE | Noted: 2023-08-28

## 2024-02-26 PROBLEM — S06.0XAA CONCUSSION: Status: RESOLVED | Noted: 2023-08-28 | Resolved: 2024-02-26

## 2024-02-26 PROCEDURE — 99396 PREV VISIT EST AGE 40-64: CPT | Performed by: FAMILY MEDICINE

## 2024-02-26 PROCEDURE — 3074F SYST BP LT 130 MM HG: CPT | Performed by: FAMILY MEDICINE

## 2024-02-26 PROCEDURE — 3078F DIAST BP <80 MM HG: CPT | Performed by: FAMILY MEDICINE

## 2024-02-26 NOTE — ASSESSMENT & PLAN NOTE
The patient's BMI is in the morbid obese range.  Thyroid levels are appropriate.  Diet, exercise and weight loss were encouraged today.

## 2024-02-26 NOTE — ASSESSMENT & PLAN NOTE
Lipid abnormalities are improving with lifestyle modifications    Plan:  Continue same medication/s without change.      Discussed medication dosage, use, side effects, and goals of treatment in detail.    Counseled patient on lifestyle modifications to help control hyperlipidemia.     Patient Treatment Goals:   LDL goal is under 100    Followup in 6 months.    The 10-year ASCVD risk score (Raimundo NOLASCO, et al., 2019) is: 0.6%    Values used to calculate the score:      Age: 43 years      Sex: Female      Is Non- : No      Diabetic: No      Tobacco smoker: No      Systolic Blood Pressure: 124 mmHg      Is BP treated: No      HDL Cholesterol: 57 mg/dL      Total Cholesterol: 197 mg/dL

## 2024-02-26 NOTE — PROGRESS NOTES
"Chief Complaint  Follow-up (6 month follow up )    Subjective        Saray Vang presents to [facility]  History of Present Illness  She is here today for follow-up for the management of her chronic medical conditions and for an annual physical exam.  She has a daughter with leukemia who is remission.  She has hypertension, hyperlipidemia, morbid obesity and history of thyroid cancer.    Since the patient's last visit she has had a fibrous lump removed that was in her neck.  Pathology report revealed it was a benign fibrous cyst with fatty necrosis.    Thyroid level was drawn a few days ago. Her free T4 was found to be upper 1.9 levels.  The patient's levothyroxine 150 mcg reduced to 137 mcg.  Repeat thyroid levels were ordered.      Objective   Vital Signs:  /74 (BP Location: Left arm, Patient Position: Sitting, Cuff Size: Adult)   Pulse 88   Temp 98.7 °F (37.1 °C) (Tympanic)   Resp 18   Ht 160 cm (62.99\")   Wt 96.3 kg (212 lb 3.2 oz)   SpO2 98%   BMI 37.60 kg/m²   Estimated body mass index is 37.6 kg/m² as calculated from the following:    Height as of this encounter: 160 cm (62.99\").    Weight as of this encounter: 96.3 kg (212 lb 3.2 oz).               Physical Exam  Vitals reviewed.   Constitutional:       Appearance: She is well-developed. She is morbidly obese.   HENT:      Head: Normocephalic and atraumatic.      Right Ear: External ear normal.      Left Ear: External ear normal.      Mouth/Throat:      Pharynx: No oropharyngeal exudate.   Eyes:      Conjunctiva/sclera: Conjunctivae normal.      Pupils: Pupils are equal, round, and reactive to light.   Neck:      Vascular: No carotid bruit.   Cardiovascular:      Rate and Rhythm: Normal rate and regular rhythm.      Heart sounds: No murmur heard.     No friction rub. No gallop.   Pulmonary:      Effort: Pulmonary effort is normal.      Breath sounds: Normal breath sounds. No wheezing or rhonchi.   Abdominal:    "   General: There is no distension.   Skin:     General: Skin is warm and dry.   Neurological:      Mental Status: She is alert and oriented to person, place, and time.      Cranial Nerves: No cranial nerve deficit.      Motor: No weakness.   Psychiatric:         Mood and Affect: Mood and affect normal.         Behavior: Behavior normal.         Thought Content: Thought content normal.         Judgment: Judgment normal.        Result Review :      CMP          8/28/2023    14:02   CMP   Glucose 86    BUN 9    Creatinine 0.71    EGFR 109.7    Sodium 142    Potassium 4.4    Chloride 106    Calcium 9.7    Total Protein 6.3    Albumin 4.6    Globulin 1.7    Total Bilirubin 0.2    Alkaline Phosphatase 94    AST (SGOT) 29    ALT (SGPT) 29    Albumin/Globulin Ratio 2.7    BUN/Creatinine Ratio 12.7    Anion Gap 10.0      CBC          8/28/2023    14:02 11/20/2023    11:17 2/23/2024    15:30   CBC   WBC 7.69  7.16  6.28    RBC 5.03  4.81  4.90    Hemoglobin 14.8  14.4  14.1    Hematocrit 42.8  41.6  41.9    MCV 85.1  86.5  85.5    MCH 29.4  29.9  28.8    MCHC 34.6  34.6  33.7    RDW 12.4  12.9  12.1    Platelets 360  344  344      Lipid Panel          8/28/2023    14:02   Lipid Panel   Total Cholesterol 197    Triglycerides 181    HDL Cholesterol 57    VLDL Cholesterol 31    LDL Cholesterol  109    LDL/HDL Ratio 1.82      TSH          10/4/2023    12:36 11/20/2023    11:17 2/23/2024    15:30   TSH   TSH 0.016  0.038  <0.005                   Assessment and Plan     Diagnoses and all orders for this visit:    1. Annual physical exam (Primary)  Assessment & Plan:  The patient was encouraged to always wear their seatbelt and never text and drive.  They were encouraged to get 7 to 8 hours sleep at night.  They were encouraged to exercise on a regular basis.  Screening labs were reviewed at today's visit and manage according to findings.      Orders:  -     Comprehensive Metabolic Panel; Future  -     CBC & Differential;  Future    2. Class 2 severe obesity due to excess calories with serious comorbidity and body mass index (BMI) of 37.0 to 37.9 in adult  Assessment & Plan:  The patient's BMI is in the morbid obese range.  Thyroid levels are appropriate.  Diet, exercise and weight loss were encouraged today.      3. Primary hypertension  Assessment & Plan:  Hypertension is stable and controlled  Continue current treatment regimen.  Blood pressure will be reassessed in 6 months.    Orders:  -     Comprehensive Metabolic Panel; Future    4. Mixed hyperlipidemia  Assessment & Plan:   Lipid abnormalities are improving with lifestyle modifications    Plan:  Continue same medication/s without change.      Discussed medication dosage, use, side effects, and goals of treatment in detail.    Counseled patient on lifestyle modifications to help control hyperlipidemia.     Patient Treatment Goals:   LDL goal is under 100    Followup in 6 months.    The 10-year ASCVD risk score (Raimundo NOLASCO, et al., 2019) is: 0.6%    Values used to calculate the score:      Age: 43 years      Sex: Female      Is Non- : No      Diabetic: No      Tobacco smoker: No      Systolic Blood Pressure: 124 mmHg      Is BP treated: No      HDL Cholesterol: 57 mg/dL      Total Cholesterol: 197 mg/dL      Orders:  -     Lipid Panel; Future    5. Acquired hypothyroidism  -     TSH+Free T4; Standing             Follow Up     No follow-ups on file.  Patient was given instructions and counseling regarding her condition or for health maintenance advice. Please see specific information pulled into the AVS if appropriate.         Answers submitted by the patient for this visit:  Other (Submitted on 2/19/2024)  Please describe your symptoms.: Thyroid bloodwork follow up  Have you had these symptoms before?: Yes  How long have you been having these symptoms?: Greater than 2 weeks  Please list any medications you are currently taking for this condition.:  Levothyroxin  Please describe any probable cause for these symptoms. : My thyroid was surgically removed from a goitor and another growth:  Primary Reason for Visit (Submitted on 2/19/2024)  What is the primary reason for your visit?: Other

## 2024-02-27 NOTE — PROGRESS NOTES
"Well Woman Visit    CC: JAMAR     HPI:   43 y.o. who presents for a well woman exam. No menses after her ablation. Recently was on antibiotics. Now having vaginal odor.    History: PMHx, Meds, Allergies, PSHx, Social Hx, and POBHx all reviewed and updated.    /86   Pulse 87   Ht 160 cm (62.99\")   Wt 95.3 kg (210 lb)   Breastfeeding No   BMI 37.21 kg/m²     Physical Exam  Vitals and nursing note reviewed. Exam conducted with a chaperone present.   Constitutional:       General: She is not in acute distress.     Appearance: Normal appearance. She is obese. She is not ill-appearing.   HENT:      Head: Normocephalic and atraumatic.      Mouth/Throat:      Mouth: Mucous membranes are moist.      Pharynx: Oropharynx is clear.   Eyes:      Extraocular Movements: Extraocular movements intact.   Neck:      Thyroid: No thyroid mass or thyromegaly.   Chest:   Breasts:     Breasts are symmetrical.      Right: Normal. No swelling, bleeding, inverted nipple, mass, nipple discharge, skin change or tenderness.      Left: Normal. No swelling, bleeding, inverted nipple, mass, nipple discharge, skin change or tenderness.   Abdominal:      General: There is no distension.      Palpations: Abdomen is soft. There is no mass.      Tenderness: There is no abdominal tenderness.      Hernia: There is no hernia in the left inguinal area or right inguinal area.   Genitourinary:     General: Normal vulva.      Exam position: Lithotomy position.      Pubic Area: No rash.       Labia:         Right: No rash, tenderness, lesion or injury.         Left: No rash, tenderness, lesion or injury.       Urethra: No prolapse, urethral pain or urethral lesion.      Vagina: Vaginal discharge present. No tenderness or prolapsed vaginal walls.      Cervix: Normal. No discharge, friability, lesion, erythema or cervical bleeding.      Uterus: Normal.       Adnexa:         Right: No mass or tenderness.          Left: No mass or tenderness.        " Comments: There is a small amount of clear white discharge present within the vaginal vault.  No significant odor is noted.  Musculoskeletal:         General: No swelling.      Right lower leg: No edema.      Left lower leg: No edema.   Lymphadenopathy:      Upper Body:      Right upper body: No supraclavicular or axillary adenopathy.      Left upper body: No supraclavicular or axillary adenopathy.   Skin:     General: Skin is warm and dry.   Neurological:      Mental Status: She is alert and oriented to person, place, and time.   Psychiatric:         Mood and Affect: Mood normal.         Behavior: Behavior normal.         Thought Content: Thought content normal.         ASSESSMENT AND PLAN:   Diagnoses and all orders for this visit:    1. Well woman exam (Primary)  Assessment & Plan:  Pap  Mammogram  Recommend daily multivitamin with folic acid    Orders:  -     IGP,rfx Aptima HPV All Pth  -     Mammo Screening Digital Tomosynthesis Bilateral With CAD; Future    2. Vaginal discharge  -     Gardnerella vaginalis, Trichomonas vaginalis, Candida albicans, DNA - Swab, Cervix        Counseling:     Track menses, RTO IF <q21d, >7d long, or heavy    Preventative:   MMG  s/p FLU vaccine this season  s/p COVID vaccine    She understands the importance of having any ordered tests to be performed in a timely fashion.  The risks of not performing them include, but are not limited to, advanced cancer stages, bone loss from osteoporosis and/or subsequent increase in morbidity and/or mortality.  She is encouraged to review her results online and/or contact or office if she has questions.     Follow Up:  Return for Annual physical.    Praful Topete MD  02/28/2024

## 2024-02-28 ENCOUNTER — OFFICE VISIT (OUTPATIENT)
Dept: OBSTETRICS AND GYNECOLOGY | Facility: CLINIC | Age: 44
End: 2024-02-28
Payer: COMMERCIAL

## 2024-02-28 ENCOUNTER — PATIENT ROUNDING (BHMG ONLY) (OUTPATIENT)
Dept: FAMILY MEDICINE CLINIC | Facility: CLINIC | Age: 44
End: 2024-02-28
Payer: COMMERCIAL

## 2024-02-28 VITALS
WEIGHT: 210 LBS | HEART RATE: 87 BPM | DIASTOLIC BLOOD PRESSURE: 86 MMHG | SYSTOLIC BLOOD PRESSURE: 131 MMHG | HEIGHT: 63 IN | BODY MASS INDEX: 37.21 KG/M2

## 2024-02-28 DIAGNOSIS — N89.8 VAGINAL DISCHARGE: ICD-10-CM

## 2024-02-28 DIAGNOSIS — Z01.419 WELL WOMAN EXAM: Primary | ICD-10-CM

## 2024-02-28 PROBLEM — D64.9 ANEMIA: Chronic | Status: RESOLVED | Noted: 2022-03-01 | Resolved: 2024-02-28

## 2024-02-28 PROBLEM — R10.2 PELVIC PAIN: Status: RESOLVED | Noted: 2023-01-24 | Resolved: 2024-02-28

## 2024-02-28 PROBLEM — Z00.00 ANNUAL PHYSICAL EXAM: Status: RESOLVED | Noted: 2022-03-01 | Resolved: 2024-02-28

## 2024-02-28 PROBLEM — N95.1 PERIMENOPAUSAL SYMPTOMS: Status: RESOLVED | Noted: 2023-01-24 | Resolved: 2024-02-28

## 2024-02-28 LAB
CANDIDA SPECIES: NEGATIVE
GARDNERELLA VAGINALIS: NEGATIVE
T VAGINALIS DNA VAG QL PROBE+SIG AMP: NEGATIVE

## 2024-02-28 PROCEDURE — 87510 GARDNER VAG DNA DIR PROBE: CPT | Performed by: OBSTETRICS & GYNECOLOGY

## 2024-02-28 PROCEDURE — 87480 CANDIDA DNA DIR PROBE: CPT | Performed by: OBSTETRICS & GYNECOLOGY

## 2024-02-28 PROCEDURE — 87660 TRICHOMONAS VAGIN DIR PROBE: CPT | Performed by: OBSTETRICS & GYNECOLOGY

## 2024-02-28 NOTE — ASSESSMENT & PLAN NOTE
Pap  Mammogram  Recommend daily multivitamin with folic acid  
Acute appendicitis  09/23/2018    Active  Maddie Landry

## 2024-02-28 NOTE — PROGRESS NOTES
A My-Chart message has been sent to the patient for PATIENT ROUNDING with [facility] [place].

## 2024-03-04 LAB
CONV .: NORMAL
CYTOLOGIST CVX/VAG CYTO: NORMAL
CYTOLOGY CVX/VAG DOC CYTO: NORMAL
CYTOLOGY CVX/VAG DOC THIN PREP: NORMAL
DX ICD CODE: NORMAL
Lab: NORMAL
Lab: NORMAL
OTHER STN SPEC: NORMAL
STAT OF ADQ CVX/VAG CYTO-IMP: NORMAL

## 2024-05-08 ENCOUNTER — HOSPITAL ENCOUNTER (OUTPATIENT)
Dept: MAMMOGRAPHY | Facility: HOSPITAL | Age: 44
Discharge: HOME | End: 2024-05-08
Admitting: OBSTETRICS & GYNECOLOGY
Payer: COMMERCIAL

## 2024-05-08 DIAGNOSIS — Z01.419 WELL WOMAN EXAM: ICD-10-CM

## 2024-05-08 PROCEDURE — 77063 BREAST TOMOSYNTHESIS BI: CPT

## 2024-05-08 PROCEDURE — 77067 SCR MAMMO BI INCL CAD: CPT

## 2024-08-21 ENCOUNTER — LAB (OUTPATIENT)
Dept: LAB | Facility: HOSPITAL | Age: 44
End: 2024-08-21
Payer: COMMERCIAL

## 2024-08-21 DIAGNOSIS — I10 PRIMARY HYPERTENSION: Chronic | ICD-10-CM

## 2024-08-21 DIAGNOSIS — Z00.00 ANNUAL PHYSICAL EXAM: ICD-10-CM

## 2024-08-21 DIAGNOSIS — E78.2 MIXED HYPERLIPIDEMIA: Chronic | ICD-10-CM

## 2024-08-21 DIAGNOSIS — E03.9 ACQUIRED HYPOTHYROIDISM: Chronic | ICD-10-CM

## 2024-08-21 LAB
ALBUMIN SERPL-MCNC: 4.4 G/DL (ref 3.5–5.2)
ALBUMIN/GLOB SERPL: 1.5 G/DL
ALP SERPL-CCNC: 89 U/L (ref 39–117)
ALT SERPL W P-5'-P-CCNC: 25 U/L (ref 1–33)
ANION GAP SERPL CALCULATED.3IONS-SCNC: 11.6 MMOL/L (ref 5–15)
AST SERPL-CCNC: 26 U/L (ref 1–32)
BASOPHILS # BLD AUTO: 0.05 10*3/MM3 (ref 0–0.2)
BASOPHILS NFR BLD AUTO: 0.7 % (ref 0–1.5)
BILIRUB SERPL-MCNC: 0.3 MG/DL (ref 0–1.2)
BUN SERPL-MCNC: 13 MG/DL (ref 6–20)
BUN/CREAT SERPL: 17.6 (ref 7–25)
CALCIUM SPEC-SCNC: 8.9 MG/DL (ref 8.6–10.5)
CHLORIDE SERPL-SCNC: 103 MMOL/L (ref 98–107)
CHOLEST SERPL-MCNC: 192 MG/DL (ref 0–200)
CO2 SERPL-SCNC: 22.4 MMOL/L (ref 22–29)
CREAT SERPL-MCNC: 0.74 MG/DL (ref 0.57–1)
DEPRECATED RDW RBC AUTO: 38.9 FL (ref 37–54)
EGFRCR SERPLBLD CKD-EPI 2021: 103.7 ML/MIN/1.73
EOSINOPHIL # BLD AUTO: 0.28 10*3/MM3 (ref 0–0.4)
EOSINOPHIL NFR BLD AUTO: 4 % (ref 0.3–6.2)
ERYTHROCYTE [DISTWIDTH] IN BLOOD BY AUTOMATED COUNT: 12.4 % (ref 12.3–15.4)
GLOBULIN UR ELPH-MCNC: 3 GM/DL
GLUCOSE SERPL-MCNC: 88 MG/DL (ref 65–99)
HCT VFR BLD AUTO: 40.3 % (ref 34–46.6)
HDLC SERPL-MCNC: 51 MG/DL (ref 40–60)
HGB BLD-MCNC: 13.7 G/DL (ref 12–15.9)
IMM GRANULOCYTES # BLD AUTO: 0.03 10*3/MM3 (ref 0–0.05)
IMM GRANULOCYTES NFR BLD AUTO: 0.4 % (ref 0–0.5)
LDLC SERPL CALC-MCNC: 127 MG/DL (ref 0–100)
LDLC/HDLC SERPL: 2.45 {RATIO}
LYMPHOCYTES # BLD AUTO: 2.1 10*3/MM3 (ref 0.7–3.1)
LYMPHOCYTES NFR BLD AUTO: 29.7 % (ref 19.6–45.3)
MCH RBC QN AUTO: 29.6 PG (ref 26.6–33)
MCHC RBC AUTO-ENTMCNC: 34 G/DL (ref 31.5–35.7)
MCV RBC AUTO: 87 FL (ref 79–97)
MONOCYTES # BLD AUTO: 0.47 10*3/MM3 (ref 0.1–0.9)
MONOCYTES NFR BLD AUTO: 6.7 % (ref 5–12)
NEUTROPHILS NFR BLD AUTO: 4.13 10*3/MM3 (ref 1.7–7)
NEUTROPHILS NFR BLD AUTO: 58.5 % (ref 42.7–76)
NRBC BLD AUTO-RTO: 0 /100 WBC (ref 0–0.2)
PLATELET # BLD AUTO: 308 10*3/MM3 (ref 140–450)
PMV BLD AUTO: 10.4 FL (ref 6–12)
POTASSIUM SERPL-SCNC: 4.3 MMOL/L (ref 3.5–5.2)
PROT SERPL-MCNC: 7.4 G/DL (ref 6–8.5)
RBC # BLD AUTO: 4.63 10*6/MM3 (ref 3.77–5.28)
SODIUM SERPL-SCNC: 137 MMOL/L (ref 136–145)
T4 FREE SERPL-MCNC: 2.01 NG/DL (ref 0.92–1.68)
TRIGL SERPL-MCNC: 79 MG/DL (ref 0–150)
TSH SERPL DL<=0.05 MIU/L-ACNC: 0.33 UIU/ML (ref 0.27–4.2)
VLDLC SERPL-MCNC: 14 MG/DL (ref 5–40)
WBC NRBC COR # BLD AUTO: 7.06 10*3/MM3 (ref 3.4–10.8)

## 2024-08-21 PROCEDURE — 80053 COMPREHEN METABOLIC PANEL: CPT

## 2024-08-21 PROCEDURE — 84443 ASSAY THYROID STIM HORMONE: CPT

## 2024-08-21 PROCEDURE — 84481 FREE ASSAY (FT-3): CPT

## 2024-08-21 PROCEDURE — 85025 COMPLETE CBC W/AUTO DIFF WBC: CPT

## 2024-08-21 PROCEDURE — 80061 LIPID PANEL: CPT

## 2024-08-21 PROCEDURE — 36415 COLL VENOUS BLD VENIPUNCTURE: CPT

## 2024-08-21 PROCEDURE — 84439 ASSAY OF FREE THYROXINE: CPT

## 2024-08-22 ENCOUNTER — OFFICE VISIT (OUTPATIENT)
Dept: FAMILY MEDICINE CLINIC | Facility: CLINIC | Age: 44
End: 2024-08-22
Payer: COMMERCIAL

## 2024-08-22 VITALS
RESPIRATION RATE: 18 BRPM | HEIGHT: 63 IN | BODY MASS INDEX: 38.45 KG/M2 | OXYGEN SATURATION: 100 % | DIASTOLIC BLOOD PRESSURE: 60 MMHG | HEART RATE: 73 BPM | WEIGHT: 217 LBS | SYSTOLIC BLOOD PRESSURE: 120 MMHG | TEMPERATURE: 97 F

## 2024-08-22 DIAGNOSIS — H60.311 ACUTE DIFFUSE OTITIS EXTERNA OF RIGHT EAR: ICD-10-CM

## 2024-08-22 DIAGNOSIS — E03.9 ACQUIRED HYPOTHYROIDISM: Primary | Chronic | ICD-10-CM

## 2024-08-22 DIAGNOSIS — I10 PRIMARY HYPERTENSION: Chronic | ICD-10-CM

## 2024-08-22 DIAGNOSIS — J30.2 SEASONAL ALLERGIES: Chronic | ICD-10-CM

## 2024-08-22 DIAGNOSIS — E66.01 CLASS 2 SEVERE OBESITY DUE TO EXCESS CALORIES WITH SERIOUS COMORBIDITY AND BODY MASS INDEX (BMI) OF 38.0 TO 38.9 IN ADULT: ICD-10-CM

## 2024-08-22 PROBLEM — N89.8 VAGINAL DISCHARGE: Status: RESOLVED | Noted: 2024-02-28 | Resolved: 2024-08-22

## 2024-08-22 PROBLEM — Z01.419 WELL WOMAN EXAM: Status: RESOLVED | Noted: 2024-02-28 | Resolved: 2024-08-22

## 2024-08-22 LAB — T3FREE SERPL-MCNC: 3.05 PG/ML (ref 2–4.4)

## 2024-08-22 PROCEDURE — 3078F DIAST BP <80 MM HG: CPT | Performed by: FAMILY MEDICINE

## 2024-08-22 PROCEDURE — 1159F MED LIST DOCD IN RCRD: CPT | Performed by: FAMILY MEDICINE

## 2024-08-22 PROCEDURE — 1160F RVW MEDS BY RX/DR IN RCRD: CPT | Performed by: FAMILY MEDICINE

## 2024-08-22 PROCEDURE — 99214 OFFICE O/P EST MOD 30 MIN: CPT | Performed by: FAMILY MEDICINE

## 2024-08-22 PROCEDURE — 3074F SYST BP LT 130 MM HG: CPT | Performed by: FAMILY MEDICINE

## 2024-08-22 PROCEDURE — 1126F AMNT PAIN NOTED NONE PRSNT: CPT | Performed by: FAMILY MEDICINE

## 2024-08-22 RX ORDER — LEVOTHYROXINE SODIUM 137 MCG
137 TABLET ORAL DAILY
Qty: 90 TABLET | Refills: 1 | Status: SHIPPED | OUTPATIENT
Start: 2024-08-22

## 2024-08-22 RX ORDER — CIPROFLOXACIN AND DEXAMETHASONE 3; 1 MG/ML; MG/ML
4 SUSPENSION/ DROPS AURICULAR (OTIC) 2 TIMES DAILY
Qty: 7.5 ML | Refills: 0 | Status: SHIPPED | OUTPATIENT
Start: 2024-08-22

## 2024-08-22 RX ORDER — CETIRIZINE HYDROCHLORIDE 10 MG/1
10 TABLET ORAL DAILY
Qty: 90 TABLET | Refills: 1 | Status: SHIPPED | OUTPATIENT
Start: 2024-08-22

## 2024-08-22 RX ORDER — FLUTICASONE PROPIONATE 50 MCG
2 SPRAY, SUSPENSION (ML) NASAL DAILY
Qty: 16 G | Refills: 11 | Status: SHIPPED | OUTPATIENT
Start: 2024-08-22

## 2024-08-22 RX ORDER — MONTELUKAST SODIUM 10 MG/1
10 TABLET ORAL NIGHTLY
Qty: 90 TABLET | Refills: 1 | Status: SHIPPED | OUTPATIENT
Start: 2024-08-22

## 2024-08-22 NOTE — ASSESSMENT & PLAN NOTE
Patient's (Body mass index is 38.45 kg/m².) indicates that they are morbidly/severely obese (BMI > 40 or > 35 with obesity - related health condition) with health conditions that include hypertension and dyslipidemias . Weight is worsening. BMI  is above average; BMI management plan is completed. We discussed low calorie, low carb based diet program, portion control, and increasing exercise.
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
## 2024-08-22 NOTE — ASSESSMENT & PLAN NOTE
Patient's thyroid levels are stable.  Will continue her on 137 mcg of Synthroid brand only and do repeat thyroid levels before her next visit in 6 months.

## 2024-08-22 NOTE — ASSESSMENT & PLAN NOTE
The patient's seasonal allergies are worsening.  She was given a prescription today of Zyrtec and Singulair to be taken as directed.

## 2024-08-23 ENCOUNTER — PATIENT ROUNDING (BHMG ONLY) (OUTPATIENT)
Dept: FAMILY MEDICINE CLINIC | Facility: CLINIC | Age: 44
End: 2024-08-23
Payer: COMMERCIAL

## 2024-08-23 NOTE — PROGRESS NOTES
A My-Chart message has been sent to the patient for PATIENT [place] [facility]

## 2025-01-30 ENCOUNTER — TELEPHONE (OUTPATIENT)
Dept: FAMILY MEDICINE CLINIC | Facility: CLINIC | Age: 45
End: 2025-01-30
Payer: COMMERCIAL

## 2025-01-30 DIAGNOSIS — H91.91 HEARING LOSS OF RIGHT EAR, UNSPECIFIED HEARING LOSS TYPE: Primary | ICD-10-CM

## 2025-01-30 NOTE — TELEPHONE ENCOUNTER
Caller: Taj, April    Relationship: Self    Best call back number: [phone]     What is the medical concern/diagnosis: RIGHT EAR ISSUES (TROUBLE HEARING OUT OF IT FOR A YEAR)    What specialty or service is being requested: ENT    What is the provider, practice or medical service name: DR. GILSON SHERWOOD    What is the office location: [address] [place] [address]    What is the office phone number: [phone]    Any additional details: PATIENT STATES THIS REFERRAL WAS SUPPOSE TO BE PUT IN OVER A MONTH AGO AND SHE HAS NOT HEARD ANYTHING.       CONTACT PATIENT TO ADVISE.”

## 2025-01-31 ENCOUNTER — OFFICE VISIT (OUTPATIENT)
Dept: FAMILY MEDICINE CLINIC | Facility: CLINIC | Age: 45
End: 2025-01-31
Payer: COMMERCIAL

## 2025-01-31 ENCOUNTER — LAB (OUTPATIENT)
Dept: LAB | Facility: HOSPITAL | Age: 45
End: 2025-01-31
Payer: COMMERCIAL

## 2025-01-31 VITALS
WEIGHT: 210.2 LBS | BODY MASS INDEX: 37.25 KG/M2 | DIASTOLIC BLOOD PRESSURE: 60 MMHG | RESPIRATION RATE: 18 BRPM | HEART RATE: 68 BPM | OXYGEN SATURATION: 99 % | TEMPERATURE: 98 F | SYSTOLIC BLOOD PRESSURE: 120 MMHG | HEIGHT: 63 IN

## 2025-01-31 DIAGNOSIS — Z32.00 POSSIBLE PREGNANCY: ICD-10-CM

## 2025-01-31 DIAGNOSIS — E03.9 ACQUIRED HYPOTHYROIDISM: Chronic | ICD-10-CM

## 2025-01-31 DIAGNOSIS — R51.9 NONINTRACTABLE HEADACHE, UNSPECIFIED CHRONICITY PATTERN, UNSPECIFIED HEADACHE TYPE: ICD-10-CM

## 2025-01-31 DIAGNOSIS — H93.8X3 EAR FULLNESS, BILATERAL: ICD-10-CM

## 2025-01-31 DIAGNOSIS — E66.01 CLASS 2 SEVERE OBESITY DUE TO EXCESS CALORIES WITH SERIOUS COMORBIDITY IN ADULT, UNSPECIFIED BMI: Primary | ICD-10-CM

## 2025-01-31 DIAGNOSIS — E66.812 CLASS 2 SEVERE OBESITY DUE TO EXCESS CALORIES WITH SERIOUS COMORBIDITY IN ADULT, UNSPECIFIED BMI: Primary | ICD-10-CM

## 2025-01-31 DIAGNOSIS — J01.90 ACUTE NON-RECURRENT SINUSITIS, UNSPECIFIED LOCATION: ICD-10-CM

## 2025-01-31 DIAGNOSIS — R10.2 PELVIC PAIN: ICD-10-CM

## 2025-01-31 LAB
ESTRADIOL SERPL HS-MCNC: 31.4 PG/ML
FSH SERPL-ACNC: 42 MIU/ML
HCG SERPL QL: NEGATIVE
LH SERPL-ACNC: 23.8 MIU/ML

## 2025-01-31 PROCEDURE — 3078F DIAST BP <80 MM HG: CPT | Performed by: NURSE PRACTITIONER

## 2025-01-31 PROCEDURE — 82670 ASSAY OF TOTAL ESTRADIOL: CPT

## 2025-01-31 PROCEDURE — 36415 COLL VENOUS BLD VENIPUNCTURE: CPT

## 2025-01-31 PROCEDURE — 83001 ASSAY OF GONADOTROPIN (FSH): CPT

## 2025-01-31 PROCEDURE — 3074F SYST BP LT 130 MM HG: CPT | Performed by: NURSE PRACTITIONER

## 2025-01-31 PROCEDURE — 83002 ASSAY OF GONADOTROPIN (LH): CPT

## 2025-01-31 PROCEDURE — 1159F MED LIST DOCD IN RCRD: CPT | Performed by: NURSE PRACTITIONER

## 2025-01-31 PROCEDURE — 84703 CHORIONIC GONADOTROPIN ASSAY: CPT

## 2025-01-31 PROCEDURE — 1160F RVW MEDS BY RX/DR IN RCRD: CPT | Performed by: NURSE PRACTITIONER

## 2025-01-31 PROCEDURE — 1125F AMNT PAIN NOTED PAIN PRSNT: CPT | Performed by: NURSE PRACTITIONER

## 2025-01-31 PROCEDURE — 99214 OFFICE O/P EST MOD 30 MIN: CPT | Performed by: NURSE PRACTITIONER

## 2025-01-31 RX ORDER — PREDNISONE 20 MG/1
40 TABLET ORAL DAILY
Qty: 10 TABLET | Refills: 0 | Status: SHIPPED | OUTPATIENT
Start: 2025-01-31 | End: 2025-02-05

## 2025-01-31 RX ORDER — AZITHROMYCIN 250 MG/1
TABLET, FILM COATED ORAL
Qty: 6 TABLET | Refills: 0 | Status: SHIPPED | OUTPATIENT
Start: 2025-01-31

## 2025-01-31 RX ORDER — LORATADINE 10 MG/1
10 TABLET ORAL DAILY
COMMUNITY
Start: 2024-11-27

## 2025-01-31 NOTE — PATIENT INSTRUCTIONS
Sinus Infection, Adult  A sinus infection is soreness and swelling (inflammation) of your sinuses. Sinuses are hollow spaces in the bones around your face. They are located:  Around your eyes.  In the middle of your forehead.  Behind your nose.  In your cheekbones.  Your sinuses and nasal passages are lined with a fluid called mucus. Mucus drains out of your sinuses. Swelling can trap mucus in your sinuses. This lets germs (bacteria, virus, or fungus) grow, which leads to infection. Most of the time, this condition is caused by a virus.  What are the causes?  Allergies.  Asthma.  Germs.  Things that block your nose or sinuses.  Growths in the nose (nasal polyps).  Chemicals or irritants in the air.  A fungus. This is rare.  What increases the risk?  Having a weak body defense system (immune system).  Doing a lot of swimming or diving.  Using nasal sprays too much.  Smoking.  What are the signs or symptoms?  The main symptoms of this condition are pain and a feeling of pressure around the sinuses. Other symptoms include:  Stuffy nose (congestion). This may make it hard to breathe through your nose.  Runny nose (drainage).  Soreness, swelling, and warmth in the sinuses.  A cough that may get worse at night.  Being unable to smell and taste.  Mucus that collects in the throat or the back of the nose (postnasal drip). This may cause a sore throat or bad breath.  Being very tired (fatigued).  A fever.  How is this diagnosed?  Your symptoms.  Your medical history.  A physical exam.  Tests to find out if your condition is short-term (acute) or long-term (chronic). Your doctor may:  Check your nose for growths (polyps).  Check your sinuses using a tool that has a light on one end (endoscope).  Check for allergies or germs.  Do imaging tests, such as an MRI or CT scan.  How is this treated?  Treatment for this condition depends on the cause and whether it is short-term or long-term.  If caused by a virus, your symptoms  should go away on their own within 10 days. You may be given medicines to relieve symptoms. They include:  Medicines that shrink swollen tissue in the nose.  A spray that treats swelling of the nostrils.  Rinses that help get rid of thick mucus in your nose (nasal saline washes).  Medicines that treat allergies (antihistamines).  Over-the-counter pain relievers.  If caused by bacteria, your doctor may wait to see if you will get better without treatment. You may be given antibiotic medicine if you have:  A very bad infection.  A weak body defense system.  If caused by growths in the nose, surgery may be needed.  Follow these instructions at home:  Medicines  Take, use, or apply over-the-counter and prescription medicines only as told by your doctor. These may include nasal sprays.  If you were prescribed an antibiotic medicine, take it as told by your doctor. Do not stop taking it even if you start to feel better.  Hydrate and humidify    Drink enough water to keep your pee (urine) pale yellow.  Use a cool mist humidifier to keep the humidity level in your home above 50%.  Breathe in steam for 10-15 minutes, 3-4 times a day, or as told by your doctor. You can do this in the bathroom while a hot shower is running.  Try not to spend time in cool or dry air.  Rest  Rest as much as you can.  Sleep with your head raised (elevated).  Make sure you get enough sleep each night.  General instructions    Put a warm, moist washcloth on your face 3-4 times a day, or as often as told by your doctor.  Use nasal saline washes as often as told by your doctor.  Wash your hands often with soap and water. If you cannot use soap and water, use hand .  Do not smoke. Avoid being around people who are smoking (secondhand smoke).  Keep all follow-up visits.  Contact a doctor if:  You have a fever.  Your symptoms get worse.  Your symptoms do not get better within 10 days.  Get help right away if:  You have a very bad headache.  You  cannot stop vomiting.  You have very bad pain or swelling around your face or eyes.  You have trouble seeing.  You feel confused.  Your neck is stiff.  You have trouble breathing.  These symptoms may be an emergency. Get help right away. Call 911.  Do not wait to see if the symptoms will go away.  Do not drive yourself to the hospital.  Summary  A sinus infection is swelling of your sinuses. Sinuses are hollow spaces in the bones around your face.  This condition is caused by tissues in your nose that become inflamed or swollen. This traps germs. These can lead to infection.  If you were prescribed an antibiotic medicine, take it as told by your doctor. Do not stop taking it even if you start to feel better.  Keep all follow-up visits.  This information is not intended to replace advice given to you by your health care provider. Make sure you discuss any questions you have with your health care provider.  Document Revised: 11/22/2022 Document Reviewed: 11/22/2022  [employer] Patient Education © 2024 [employer]

## 2025-01-31 NOTE — ASSESSMENT & PLAN NOTE
Patient's (Body mass index is 37.24 kg/m².) indicates that they are obese (BMI >30) with health conditions that include none . Weight is improving with lifestyle modifications. BMI  is above average; BMI management plan is completed. We discussed portion control and increasing exercise.

## 2025-01-31 NOTE — PROGRESS NOTES
Chief Complaint     Ear Fullness (X1 week.), Abdominal Cramping, and Headache (X1 day)    History of Present Illness     Saray Vang is a 44 y.o. female who presents to [facility] for evaluation of ear fullness x 1 week, sinus pain/pressure, abd cramping, and HA x 1 week. Denies fever, chills, SOB, chest pain, SOB, sore throat, nausea, vomiting, diarrhea, constipation, or other. No known sick contacts. Pt with previous BTL and ablation.            History      Past Medical History:   Diagnosis Date    Abnormal uterine bleeding (AUB)     NO CURRENT ISSUES HAD ABLATION    Allergic Childhood    Seasonal    Anemia     DENIES ANY CURRENT ISSUES    Arthritis 2020    Hands off and on    Depression Childhood    Off and on    Disease of thyroid gland     total thyroidectomy    Headache Adult    Off and on    Hyperlipidemia     Hypertension ,     HX OF DURING PREGNANCY NO CURRENT ISSUES    Hyperthyroidism     Thyroid removed    Hypothyroidism     Half of thyroid removed    PONV (postoperative nausea and vomiting)     Subcutaneous nodule     LEFT NECK    Visual impairment Childhood, ,     Glasses, retinal tears       Past Surgical History:   Procedure Laterality Date     SECTION      Twins    D & C HYSTEROSCOPY ENDOMETRIAL ABLATION N/A 2021    Procedure: DILATATION AND CURETTAGE, HYSTEROSCOPY, NOVASURE ENDOMETRIAL ABLATION;  Surgeon: Praful Topete MD;  Location: [facility] OR [facility] [place];  Service: Gynecology;  Laterality: N/A;    ENDOMETRIAL ABLATION  2019    EXCISION LESION Left 2024    Procedure: Excision of subcutaneous nodule from neck;  Surgeon: Jacky Tracy MD;  Location: [facility] OR [facility] [place];  Service: General;  Laterality: Left;    EYE SURGERY  ?, ?,     LASIK, Retinal tears    HEMORRHOIDECTOMY      LAPAROSCOPIC TUBAL LIGATION      THYROIDECTOMY, PARTIAL      INITIALLY AND THEN HAD REMAINDER REMOVED LATER SURGERY    TOTAL  THYROIDECTOMY      TUBAL ABDOMINAL LIGATION  2015    UMBILICAL HERNIA REPAIR  2014    From pregnancy       Family History   Problem Relation Age of Onset    Arthritis Father     Hyperlipidemia Father     Cancer Father         Kidney cancer 2023    Arthritis Mother     Cancer Mother     Depression Mother     Thyroid disease Mother     Miscarriages / Stillbirths Mother     Early death Mother         Killed by a drunk  06/24/2023    Asthma Daughter     Depression Daughter     Asthma Daughter     Cancer Daughter     Hearing loss Paternal Grandfather     Mental illness Paternal Grandfather         Alzheimer’s    Diabetes Paternal Grandmother     Thyroid disease Paternal Grandmother     Alcohol abuse Maternal Grandmother     Breast cancer Maternal Grandmother     Depression Maternal Grandmother     Alcohol abuse Maternal Grandfather     Depression Maternal Grandfather     Hyperlipidemia Maternal Grandfather     Stroke Maternal Grandfather     Drug abuse Paternal Aunt     Alcohol abuse Paternal Uncle     Drug abuse Paternal Uncle     Early death Paternal Uncle         Fell down stairs 2023    Ovarian cancer Neg Hx     Uterine cancer Neg Hx     Cervical cancer Neg Hx     Colon cancer Neg Hx     Stomach cancer Neg Hx     Skin cancer Neg Hx     Malig Hyperthermia Neg Hx     Clotting disorder Neg Hx         Current Medications        Current Outpatient Medications:     cetirizine (zyrTEC) 10 MG tablet, Take 1 tablet by mouth Daily., Disp: 90 tablet, Rfl: 1    fluticasone (FLONASE) 50 MCG/ACT nasal spray, 2 sprays into the nostril(s) as directed by provider Daily., Disp: 16 g, Rfl: 11    loratadine (CLARITIN) 10 MG tablet, Take 1 tablet by mouth Daily., Disp: , Rfl:     montelukast (Singulair) 10 MG tablet, Take 1 tablet by mouth Every Night., Disp: 90 tablet, Rfl: 1    Synthroid 137 MCG tablet, Take 1 tablet by mouth Daily., Disp: 90 tablet, Rfl: 1    azithromycin (Zithromax Z-Valerio) 250 MG tablet, UAD, Disp: 6 tablet,  "Rfl: 0    predniSONE (DELTASONE) 20 MG tablet, Take 2 tablets by mouth Daily for 5 days., Disp: 10 tablet, Rfl: 0     Allergies     Allergies   Allergen Reactions    Morphine Itching    Sudafed [Pseudoephedrine] Nausea Only       Social History       Social History     Social History Narrative    Not on file       Immunizations     Immunization:  Immunization History   Administered Date(s) Administered    COVID-19 (MODERNA) 1st,2nd,3rd Dose Monovalent 04/22/2021, 05/19/2021    Fluzone Quad >6mos (Multi-dose) 11/24/2020    Influenza Seasonal Injectable 10/27/2008    TD Preservative Free (Tenivac) 05/07/2013    Tdap 06/12/2014, 07/23/2020          Objective     Objective     Vital Signs:   /60 (BP Location: Left arm, Patient Position: Sitting, Cuff Size: Adult)   Pulse 68   Temp 98 °F (36.7 °C) (Temporal)   Resp 18   Ht 160 cm (62.99\")   Wt 95.3 kg (210 lb 3.2 oz)   SpO2 99%   BMI 37.24 kg/m²       Physical Exam  Vitals reviewed.   Constitutional:       General: She is not in acute distress.  HENT:      Head: Normocephalic.      Left Ear: Tympanic membrane normal.      Ears:      Comments: R ear cloudiness, canal erythema, no edema      Nose: Nose normal.      Right Sinus: Maxillary sinus tenderness present.      Left Sinus: Maxillary sinus tenderness present.      Mouth/Throat:      Pharynx: Oropharynx is clear. No posterior oropharyngeal erythema.   Eyes:      General: No scleral icterus.     Extraocular Movements: Extraocular movements intact.      Conjunctiva/sclera: Conjunctivae normal.      Pupils: Pupils are equal, round, and reactive to light.   Cardiovascular:      Rate and Rhythm: Normal rate and regular rhythm.      Pulses: Normal pulses.      Heart sounds: Normal heart sounds.   Pulmonary:      Effort: Pulmonary effort is normal.      Breath sounds: Normal breath sounds.   Abdominal:      General: Bowel sounds are normal.      Palpations: Abdomen is soft.   Musculoskeletal:         General: " Normal range of motion.      Cervical back: Neck supple.   Skin:     General: Skin is warm and dry.   Neurological:      Mental Status: She is alert and oriented to person, place, and time.   Psychiatric:         Mood and Affect: Mood normal.         Behavior: Behavior normal.         Thought Content: Thought content normal.         Judgment: Judgment normal.         Results      Result Review :   The following data was reviewed by: MAGGIE Vincent on 01/31/2025:  Common labs          2/23/2024    15:30 8/21/2024    11:53   Common Labs   Glucose  88    BUN  13    Creatinine  0.74    Sodium  137    Potassium  4.3    Chloride  103    Calcium  8.9    Albumin  4.4    Total Bilirubin  0.3    Alkaline Phosphatase  89    AST (SGOT)  26    ALT (SGPT)  25    WBC 6.28  7.06    Hemoglobin 14.1  13.7    Hematocrit 41.9  40.3    Platelets 344  308    Total Cholesterol  192    Triglycerides  79    HDL Cholesterol  51    LDL Cholesterol   127      Radiologic studies pelvic US 2023 and Consultant notes ob/gyn        Assessment and Plan        Assessment and Plan    Diagnoses and all orders for this visit:    1. Class 2 severe obesity due to excess calories with serious comorbidity in adult, unspecified BMI (Primary)  Assessment & Plan:  Patient's (Body mass index is 37.24 kg/m².) indicates that they are obese (BMI >30) with health conditions that include none . Weight is improving with lifestyle modifications. BMI  is above average; BMI management plan is completed. We discussed portion control and increasing exercise.       2. Acquired hypothyroidism  Assessment & Plan:  Controlled  Reviewed labs and medications   Continue levothyroxine as prescribed         3. Ear fullness, bilateral    4. Pelvic pain  Assessment & Plan:  Order for US and labs   CTM  Advised patient to proceed directly to ED if s/s worsen or patient experiences chest pain, SOB, abd pain, or extreme HA or dizziness. All questions answered. Pt  verbalized understanding and agreed to POC.   Keep all FU with ob/gyn    Orders:  -     US Non-ob Transvaginal; Future  -     Follicle stimulating hormone; Future  -     Luteinizing hormone; Future  -     Estradiol; Future    5. Nonintractable headache, unspecified chronicity pattern, unspecified headache type  Comments:  tyl/ibu UAD prn   avoid HA triggers    6. Possible pregnancy  -     hCG, Serum, Qualitative; Future    7. Acute non-recurrent sinusitis, unspecified location  Assessment & Plan:  Zpack UAD   Prednisone 40mg PO qd x 5 days   Increase rest/clear fluids   Avoid allergy/sinus triggers         Other orders  -     azithromycin (Zithromax Z-Valerio) 250 MG tablet; UAD  Dispense: 6 tablet; Refill: 0  -     predniSONE (DELTASONE) 20 MG tablet; Take 2 tablets by mouth Daily for 5 days.  Dispense: 10 tablet; Refill: 0              Follow Up        Follow Up   Return if symptoms worsen or fail to improve.  Patient was given instructions and counseling regarding her condition or for health maintenance advice. Please see specific information pulled into the AVS if appropriate.

## 2025-01-31 NOTE — ASSESSMENT & PLAN NOTE
Order for US and labs   CTM  Advised patient to proceed directly to ED if s/s worsen or patient experiences chest pain, SOB, abd pain, or extreme HA or dizziness. All questions answered. Pt verbalized understanding and agreed to POC.   Keep all FU with ob/gyn

## 2025-01-31 NOTE — ASSESSMENT & PLAN NOTE
Zpack UAD   Prednisone 40mg PO qd x 5 days   Increase rest/clear fluids   Avoid allergy/sinus triggers

## 2025-02-12 ENCOUNTER — HOSPITAL ENCOUNTER (OUTPATIENT)
Dept: ULTRASOUND IMAGING | Facility: HOSPITAL | Age: 45
Discharge: HOME | End: 2025-02-12
Admitting: NURSE PRACTITIONER
Payer: COMMERCIAL

## 2025-02-12 DIAGNOSIS — R10.2 PELVIC PAIN: ICD-10-CM

## 2025-02-12 PROCEDURE — 76830 TRANSVAGINAL US NON-OB: CPT

## 2025-02-21 ENCOUNTER — LAB (OUTPATIENT)
Dept: LAB | Facility: HOSPITAL | Age: 45
End: 2025-02-21
Payer: COMMERCIAL

## 2025-02-21 DIAGNOSIS — E03.9 ACQUIRED HYPOTHYROIDISM: Chronic | ICD-10-CM

## 2025-02-21 LAB
T4 FREE SERPL-MCNC: 1.73 NG/DL (ref 0.92–1.68)
TSH SERPL DL<=0.05 MIU/L-ACNC: 0.24 UIU/ML (ref 0.27–4.2)

## 2025-02-21 PROCEDURE — 84439 ASSAY OF FREE THYROXINE: CPT

## 2025-02-21 PROCEDURE — 84443 ASSAY THYROID STIM HORMONE: CPT

## 2025-02-21 PROCEDURE — 36415 COLL VENOUS BLD VENIPUNCTURE: CPT

## 2025-02-24 ENCOUNTER — OFFICE VISIT (OUTPATIENT)
Dept: FAMILY MEDICINE CLINIC | Facility: CLINIC | Age: 45
End: 2025-02-24
Payer: COMMERCIAL

## 2025-02-24 VITALS
TEMPERATURE: 98.1 F | OXYGEN SATURATION: 100 % | DIASTOLIC BLOOD PRESSURE: 78 MMHG | WEIGHT: 212.2 LBS | SYSTOLIC BLOOD PRESSURE: 120 MMHG | BODY MASS INDEX: 37.6 KG/M2 | HEIGHT: 63 IN | HEART RATE: 80 BPM | RESPIRATION RATE: 17 BRPM

## 2025-02-24 DIAGNOSIS — I10 PRIMARY HYPERTENSION: Chronic | ICD-10-CM

## 2025-02-24 DIAGNOSIS — E66.812 CLASS 2 SEVERE OBESITY DUE TO EXCESS CALORIES WITH SERIOUS COMORBIDITY AND BODY MASS INDEX (BMI) OF 37.0 TO 37.9 IN ADULT: Chronic | ICD-10-CM

## 2025-02-24 DIAGNOSIS — E66.01 CLASS 2 SEVERE OBESITY DUE TO EXCESS CALORIES WITH SERIOUS COMORBIDITY AND BODY MASS INDEX (BMI) OF 37.0 TO 37.9 IN ADULT: Chronic | ICD-10-CM

## 2025-02-24 DIAGNOSIS — E78.2 MIXED HYPERLIPIDEMIA: Chronic | ICD-10-CM

## 2025-02-24 DIAGNOSIS — E03.9 ACQUIRED HYPOTHYROIDISM: Primary | Chronic | ICD-10-CM

## 2025-02-24 PROCEDURE — 1126F AMNT PAIN NOTED NONE PRSNT: CPT | Performed by: FAMILY MEDICINE

## 2025-02-24 PROCEDURE — 3078F DIAST BP <80 MM HG: CPT | Performed by: FAMILY MEDICINE

## 2025-02-24 PROCEDURE — 1159F MED LIST DOCD IN RCRD: CPT | Performed by: FAMILY MEDICINE

## 2025-02-24 PROCEDURE — 99214 OFFICE O/P EST MOD 30 MIN: CPT | Performed by: FAMILY MEDICINE

## 2025-02-24 PROCEDURE — 1160F RVW MEDS BY RX/DR IN RCRD: CPT | Performed by: FAMILY MEDICINE

## 2025-02-24 PROCEDURE — 3074F SYST BP LT 130 MM HG: CPT | Performed by: FAMILY MEDICINE

## 2025-02-24 RX ORDER — LEVOTHYROXINE SODIUM 125 MCG
125 TABLET ORAL EVERY OTHER DAY
Qty: 45 TABLET | Refills: 1 | Status: SHIPPED | OUTPATIENT
Start: 2025-02-24

## 2025-02-24 RX ORDER — LEVOTHYROXINE SODIUM 137 MCG
137 TABLET ORAL EVERY OTHER DAY
Start: 2025-02-24

## 2025-02-24 NOTE — ASSESSMENT & PLAN NOTE
Hypertension is stable and controlled  She is currently not on any medications.  She was encouraged to reduce sodium from her diet as well as weight loss.  Blood pressure will be reassessed in 6 months.

## 2025-02-24 NOTE — PROGRESS NOTES
"Chief Complaint  Thyroid Problem (Abnormal thyroid levels) and Menopause (Pt wanting to discuss hormone labs that were drawn last week)    Subjective        Saray Vang presents to [facility]  History of Present Illness  She is here today for follow-up for the management of her chronic medical conditions.  She has a daughter with leukemia who is remission.  She has hypertension, hyperlipidemia, morbid obesity and history of thyroid cancer.     She is having uterine cramping. A transvaginal US was revealing for fibroids. She has an appointment with Gyn in a few days.      Thyroid level was drawn a few days ago. Her free T4 was found to be upper 1.7 levels.  The patient's on 137 mcg. She denies feeling anxious, having racing heart beats or diarrhea.        Thyroid Followup Bea Menopause Followup  Symptoms are: chronic.   Onset was 1 to 6 months.   Symptoms occur: constantly.  Symptoms include: abdominal pain, joint pain, diaphoresis, fatigue, headaches, joint swelling, myalgias, nausea, numbness, vertigo, visual change and weakness.   Pertinent negative symptoms include no anorexia, no change in stool, no chest pain, no chills, no congestion, no cough, no fever, no neck pain, no rash, no sore throat, no swollen glands, no dysuria and no vomiting.   Treatment and/or Medications comments include: N/A   Thyroid Problem  Symptoms include diaphoresis, fatigue and visual change.       Objective   Vital Signs:  /78 (BP Location: Left arm, Patient Position: Sitting, Cuff Size: Adult)   Pulse 80   Temp 98.1 °F (36.7 °C) (Temporal)   Resp 17   Ht 160 cm (62.99\")   Wt 96.3 kg (212 lb 3.2 oz)   SpO2 100%   BMI 37.60 kg/m²   Estimated body mass index is 37.6 kg/m² as calculated from the following:    Height as of this encounter: 160 cm (62.99\").    Weight as of this encounter: 96.3 kg (212 lb 3.2 oz).            Physical Exam  Vitals reviewed.   Constitutional:       Appearance: She " is well-developed. She is morbidly obese.   HENT:      Head: Normocephalic and atraumatic.      Right Ear: External ear normal.      Left Ear: External ear normal.      Mouth/Throat:      Pharynx: No oropharyngeal exudate.   Eyes:      Conjunctiva/sclera: Conjunctivae normal.      Pupils: Pupils are equal, round, and reactive to light.   Neck:      Vascular: No carotid bruit.   Cardiovascular:      Rate and Rhythm: Normal rate and regular rhythm.      Heart sounds: No murmur heard.     No friction rub. No gallop.   Pulmonary:      Effort: Pulmonary effort is normal.      Breath sounds: Normal breath sounds. No wheezing or rhonchi.   Abdominal:      General: There is no distension.   Skin:     General: Skin is warm and dry.   Neurological:      Mental Status: She is alert and oriented to person, place, and time.      Cranial Nerves: No cranial nerve deficit.      Motor: No weakness.   Psychiatric:         Mood and Affect: Mood and affect normal.         Behavior: Behavior normal.         Thought Content: Thought content normal.         Judgment: Judgment normal.        Result Review :    CMP          8/21/2024    11:53   CMP   Glucose 88    BUN 13    Creatinine 0.74    EGFR 103.7    Sodium 137    Potassium 4.3    Chloride 103    Calcium 8.9    Total Protein 7.4    Albumin 4.4    Globulin 3.0    Total Bilirubin 0.3    Alkaline Phosphatase 89    AST (SGOT) 26    ALT (SGPT) 25    Albumin/Globulin Ratio 1.5    BUN/Creatinine Ratio 17.6    Anion Gap 11.6      CBC          8/21/2024    11:53   CBC   WBC 7.06    RBC 4.63    Hemoglobin 13.7    Hematocrit 40.3    MCV 87.0    MCH 29.6    MCHC 34.0    RDW 12.4    Platelets 308      Lipid Panel          8/21/2024    11:53   Lipid Panel   Total Cholesterol 192    Triglycerides 79    HDL Cholesterol 51    VLDL Cholesterol 14    LDL Cholesterol  127    LDL/HDL Ratio 2.45      TSH          8/21/2024    11:53 2/21/2025    15:10   TSH   TSH 0.330  0.236                Assessment and  Plan   Diagnoses and all orders for this visit:    1. Acquired hypothyroidism (Primary)  Assessment & Plan:  The patient was educated about her elevated thyroid levels.  Her Synthroid was reduced to 137 mcg every other day with 125 mcg the days in between.  Repeat thyroid levels were ordered today and we will see her back in 6 months and manage according to presentation.    Orders:  -     Synthroid 125 MCG tablet; Take 1 tablet by mouth Every Other Day.  Dispense: 45 tablet; Refill: 1  -     TSH+Free T4; Future  -     Synthroid 137 MCG tablet; Take 1 tablet by mouth Every Other Day.    2. Class 2 severe obesity due to excess calories with serious comorbidity and body mass index (BMI) of 37.0 to 37.9 in adult  Assessment & Plan:  Patient's (Body mass index is 37.6 kg/m².) indicates that they are morbidly/severely obese (BMI > 40 or > 35 with obesity - related health condition) with health conditions that include hypertension and hyperlipidemia. Weight is worsening. BMI  is above average; BMI management plan is completed. We discussed low calorie, low carb based diet program, portion control, and increasing exercise.       3. Mixed hyperlipidemia  Assessment & Plan:   Lipid abnormalities are improving with lifestyle modifications    Plan:  Continue same medication/s without change.      Discussed medication dosage, use, side effects, and goals of treatment in detail.    Counseled patient on lifestyle modifications to help control hyperlipidemia.     Patient Treatment Goals:   LDL goal is under 100    Followup in 6 months.    The 10-year ASCVD risk score (Raimundo NOLASCO, et al., 2019) is: 0.7%    Values used to calculate the score:      Age: 44 years      Sex: Female      Is Non- : No      Diabetic: No      Tobacco smoker: No      Systolic Blood Pressure: 120 mmHg      Is BP treated: No      HDL Cholesterol: 51 mg/dL      Total Cholesterol: 192 mg/dL        4. Primary hypertension  Assessment &  Plan:  Hypertension is stable and controlled  She is currently not on any medications.  She was encouraged to reduce sodium from her diet as well as weight loss.  Blood pressure will be reassessed in 6 months.               Follow Up   Return in about 6 months (around 8/24/2025).  Patient was given instructions and counseling regarding her condition or for health maintenance advice. Please see specific information pulled into the AVS if appropriate.

## 2025-02-24 NOTE — ASSESSMENT & PLAN NOTE
The patient was educated about her elevated thyroid levels.  Her Synthroid was reduced to 137 mcg every other day with 125 mcg the days in between.  Repeat thyroid levels were ordered today and we will see her back in 6 months and manage according to presentation.

## 2025-02-24 NOTE — ASSESSMENT & PLAN NOTE
Patient's (Body mass index is 37.6 kg/m².) indicates that they are morbidly/severely obese (BMI > 40 or > 35 with obesity - related health condition) with health conditions that include hypertension and hyperlipidemia. Weight is worsening. BMI  is above average; BMI management plan is completed. We discussed low calorie, low carb based diet program, portion control, and increasing exercise.

## 2025-02-24 NOTE — ASSESSMENT & PLAN NOTE
Lipid abnormalities are improving with lifestyle modifications    Plan:  Continue same medication/s without change.      Discussed medication dosage, use, side effects, and goals of treatment in detail.    Counseled patient on lifestyle modifications to help control hyperlipidemia.     Patient Treatment Goals:   LDL goal is under 100    Followup in 6 months.    The 10-year ASCVD risk score (Raimundo NOLASCO, et al., 2019) is: 0.7%    Values used to calculate the score:      Age: 44 years      Sex: Female      Is Non- : No      Diabetic: No      Tobacco smoker: No      Systolic Blood Pressure: 120 mmHg      Is BP treated: No      HDL Cholesterol: 51 mg/dL      Total Cholesterol: 192 mg/dL

## 2025-03-10 ENCOUNTER — OFFICE VISIT (OUTPATIENT)
Dept: OBSTETRICS AND GYNECOLOGY | Facility: CLINIC | Age: 45
End: 2025-03-10
Payer: COMMERCIAL

## 2025-03-10 VITALS
HEART RATE: 82 BPM | BODY MASS INDEX: 36.86 KG/M2 | HEIGHT: 63 IN | WEIGHT: 208 LBS | DIASTOLIC BLOOD PRESSURE: 89 MMHG | SYSTOLIC BLOOD PRESSURE: 142 MMHG

## 2025-03-10 DIAGNOSIS — N95.1 MENOPAUSAL SYMPTOMS: ICD-10-CM

## 2025-03-10 DIAGNOSIS — Z01.419 WELL WOMAN EXAM: Primary | ICD-10-CM

## 2025-03-10 PROBLEM — R10.2 PELVIC PAIN: Status: RESOLVED | Noted: 2025-01-31 | Resolved: 2025-03-10

## 2025-03-10 PROBLEM — J01.90 ACUTE NON-RECURRENT SINUSITIS: Status: RESOLVED | Noted: 2025-01-31 | Resolved: 2025-03-10

## 2025-03-10 LAB
ESTRADIOL SERPL HS-MCNC: 36.7 PG/ML
FSH SERPL-ACNC: 49 MIU/ML

## 2025-03-10 PROCEDURE — 99396 PREV VISIT EST AGE 40-64: CPT | Performed by: OBSTETRICS & GYNECOLOGY

## 2025-03-10 PROCEDURE — 82670 ASSAY OF TOTAL ESTRADIOL: CPT | Performed by: OBSTETRICS & GYNECOLOGY

## 2025-03-10 PROCEDURE — 1160F RVW MEDS BY RX/DR IN RCRD: CPT | Performed by: OBSTETRICS & GYNECOLOGY

## 2025-03-10 PROCEDURE — 83001 ASSAY OF GONADOTROPIN (FSH): CPT | Performed by: OBSTETRICS & GYNECOLOGY

## 2025-03-10 PROCEDURE — 3077F SYST BP >= 140 MM HG: CPT | Performed by: OBSTETRICS & GYNECOLOGY

## 2025-03-10 PROCEDURE — 3079F DIAST BP 80-89 MM HG: CPT | Performed by: OBSTETRICS & GYNECOLOGY

## 2025-03-10 PROCEDURE — 1159F MED LIST DOCD IN RCRD: CPT | Performed by: OBSTETRICS & GYNECOLOGY

## 2025-03-10 PROCEDURE — G0123 SCREEN CERV/VAG THIN LAYER: HCPCS | Performed by: OBSTETRICS & GYNECOLOGY

## 2025-03-10 RX ORDER — ESTRADIOL/NORETHINDRONE ACETATE TRANSDERMAL SYSTEM .05; .14 MG/D; MG/D
1 PATCH, EXTENDED RELEASE TRANSDERMAL 2 TIMES WEEKLY
Qty: 8 PATCH | Refills: 3 | Status: SHIPPED | OUTPATIENT
Start: 2025-03-10

## 2025-03-10 NOTE — PROGRESS NOTES
"Well Woman Visit    CC: Well woman visit    Subjective:   44 y.o. who presents for a well woman exam.  The patient reports several concerning symptoms.  She states that she does not have any menstrual cycles due to her history of endometrial ablation.  She does report having significant menopausal symptoms such as hot flashes, night sweats, disrupted sleep, mental fogginess.  She also complains of some pelvic pain.  She reports that her PCP ordered some \"hormonal testing\" and a pelvic ultrasound, but urged her to review that with me.    Last PAP:   Last Completed Pap Smear            Awaiting Completion       PAP SMEAR (Every 3 Years) Order placed this encounter      03/10/2025  Order placed for IGP,rfx Aptima HPV All Pth by Praful Topete MD    2024  IGP,rfx Aptima HPV All Pth    2023  IGP,rfx Aptima HPV All Pth    2021  SCANNED - PAP SMEAR                          Last MMG:   Last Completed Mammogram            Upcoming       MAMMOGRAM (Every 2 Years) Next due on 2024  Mammo Screening Digital Tomosynthesis Bilateral With CAD    2023  Mammo Diagnostic Digital Tomosynthesis Bilateral With CAD    2021  Mammo Screening Digital Tomosynthesis Bilateral With CAD    2018  Mammo Screening Digital Tomosynthesis Bilateral With CAD    2012  SCANNED - MAMMO     Only the first 5 history entries have been loaded, but more history exists.                           History: PMHx, Meds, Allergies, PSHx, Social Hx, and POBHx all reviewed and updated.    /89   Pulse 82   Ht 160 cm (62.99\")   Wt 94.3 kg (208 lb)   BMI 36.86 kg/m²     Physical Exam  Vitals and nursing note reviewed. Exam conducted with a chaperone present.   Constitutional:       General: She is not in acute distress.     Appearance: Normal appearance. She is not ill-appearing.   HENT:      Head: Normocephalic and atraumatic.      Mouth/Throat:      Mouth: Mucous membranes are " moist.      Pharynx: Oropharynx is clear.   Eyes:      Extraocular Movements: Extraocular movements intact.   Neck:      Thyroid: No thyroid mass or thyromegaly.   Chest:   Breasts:     Breasts are symmetrical.      Right: Normal. No swelling, bleeding, inverted nipple, mass, nipple discharge, skin change or tenderness.      Left: Normal. No swelling, bleeding, inverted nipple, mass, nipple discharge, skin change or tenderness.   Abdominal:      General: There is no distension.      Palpations: Abdomen is soft. There is no mass.      Tenderness: There is no abdominal tenderness.      Hernia: There is no hernia in the left inguinal area or right inguinal area.   Genitourinary:     General: Normal vulva.      Exam position: Lithotomy position.      Pubic Area: No rash.       Labia:         Right: No rash, tenderness, lesion or injury.         Left: No rash, tenderness, lesion or injury.       Urethra: No prolapse, urethral pain or urethral lesion.      Vagina: Normal. No vaginal discharge, erythema, tenderness, bleeding or prolapsed vaginal walls.      Cervix: Normal. No friability, lesion, erythema or cervical bleeding.      Uterus: Normal. Not enlarged, not fixed, not tender and no uterine prolapse.       Adnexa:         Right: No mass or tenderness.          Left: No mass or tenderness.     Musculoskeletal:         General: No swelling.      Right lower leg: No edema.      Left lower leg: No edema.   Lymphadenopathy:      Upper Body:      Right upper body: No supraclavicular or axillary adenopathy.      Left upper body: No supraclavicular or axillary adenopathy.   Skin:     General: Skin is warm and dry.      Findings: No rash.   Neurological:      Mental Status: She is alert and oriented to person, place, and time.   Psychiatric:         Mood and Affect: Mood normal.         Behavior: Behavior normal.         Thought Content: Thought content normal.         Assessment and Plan:  Diagnoses and all orders for this  visit:    1. Well woman exam (Primary)  Assessment & Plan:  Pap  Mammogram  Recommend daily multivitamin with folic acid    Orders:  -     IGP,rfx Aptima HPV All Pth    2. Menopausal symptoms  Assessment & Plan:  I have reviewed the patient's pelvic ultrasound and labs.  Her FSH is significantly elevated, however, we discussed that we cannot rely upon a single elevated FSH or decreased estradiol to confirm menopausal status.  The preferred method would be the absence of menstruation for 1 consecutive year, however, the patient has had an endometrial ablation so menstrual tracking is not really feasible.  Recommended repeating FSH and estradiol today.  If her FSH remains persistently elevated and her estradiol persistently decreased then we can infer menopause by that testing.  We discussed that even the patient is perimenopausal it is still reasonable to consider treatment of her symptoms.  We discussed the treatment of vasomotor symptoms and menopausal symptoms with both nonhormonal and hormonal treatments.  The risk, benefits, and alternatives of each of these have been discussed.  The patient would like to start CombiPatch.  Will start CombiPatch 0.05/0.14 mg/day.  The patient will apply 1 patch twice per week.  The risk, benefits, and alternatives were discussed with the patient and she wishes to proceed.  Will plan to reevaluate in 4 to 6 weeks to assess her symptoms.    Orders:  -     estradiol-norethindrone (CombiPatch) 0.05-0.14 MG/DAY patch; Place 1 patch on the skin as directed by provider 2 (Two) Times a Week.  Dispense: 8 patch; Refill: 3  -     Follicle Stimulating Hormone  -     Estradiol        Counseling:     HRT R/B/A/SE/E all options including non-FDA approved options reviewed    Preventative:   MMG  s/p FLU vaccine this season  s/p COVID vaccine    She understands the importance of having any ordered tests to be performed in a timely fashion.  The risks of not performing them include, but are not  limited to, advanced cancer stages, bone loss from osteoporosis and/or subsequent increase in morbidity and/or mortality.  She is encouraged to review her results online and/or contact or office if she has questions.     Follow Up:  Return in about 4 weeks (around 4/7/2025) for Recheck.    Praful Topete MD  03/10/2025

## 2025-03-11 NOTE — ASSESSMENT & PLAN NOTE
I have reviewed the patient's pelvic ultrasound and labs.  Her FSH is significantly elevated, however, we discussed that we cannot rely upon a single elevated FSH or decreased estradiol to confirm menopausal status.  The preferred method would be the absence of menstruation for 1 consecutive year, however, the patient has had an endometrial ablation so menstrual tracking is not really feasible.  Recommended repeating FSH and estradiol today.  If her FSH remains persistently elevated and her estradiol persistently decreased then we can infer menopause by that testing.  We discussed that even the patient is perimenopausal it is still reasonable to consider treatment of her symptoms.  We discussed the treatment of vasomotor symptoms and menopausal symptoms with both nonhormonal and hormonal treatments.  The risk, benefits, and alternatives of each of these have been discussed.  The patient would like to start CombiPatch.  Will start CombiPatch 0.05/0.14 mg/day.  The patient will apply 1 patch twice per week.  The risk, benefits, and alternatives were discussed with the patient and she wishes to proceed.  Will plan to reevaluate in 4 to 6 weeks to assess her symptoms.

## 2025-03-17 LAB
CONV .: NORMAL
CYTOLOGIST CVX/VAG CYTO: NORMAL
CYTOLOGY CVX/VAG DOC CYTO: NORMAL
CYTOLOGY CVX/VAG DOC THIN PREP: NORMAL
DX ICD CODE: NORMAL
Lab: NORMAL
OTHER STN SPEC: NORMAL
SERVICE CMNT-IMP: NORMAL
STAT OF ADQ CVX/VAG CYTO-IMP: NORMAL

## 2025-03-24 ENCOUNTER — TELEPHONE (OUTPATIENT)
Dept: SURGERY | Facility: OTHER | Age: 45
End: 2025-03-24
Payer: COMMERCIAL

## 2025-03-24 NOTE — TELEPHONE ENCOUNTER
Caller: Saray Vang     Relationship: SELF    Best call back number:  [phone]     What is the best time to reach you: ANYTIME     Who are you requesting to speak with (clinical staff, provider,  specific staff member): NON-CLINICAL         What was the call regarding: PT IS REQUESTING AN 4 WEEK FOLLOW UP WITH DR CHINCHILLA - NO APPTS AVAIL- PT STATES SHE  STARTED MEDICATION AND HAS SOME THINGS SHE WOULD LIKE TO TALK WITH DR CHINCHILLA IN REGARDS TO - AND DR TERESA STERLING REQUESTED HER COME BACK IN - 4 WEEKS - [facility] WAS UNABLE TO SCHEDULE WITH IN 4 WEEK TIMEFRAME- PT IS REQUESTING A CALL BACK FOR SCHEDULING

## 2025-03-24 NOTE — TELEPHONE ENCOUNTER
Left message for patient to let her know that a message is being sent for the appointment request and for her to keep an eye on MyChart. OK for HUB to relay message.

## 2025-03-28 ENCOUNTER — TELEPHONE (OUTPATIENT)
Dept: FAMILY MEDICINE CLINIC | Facility: CLINIC | Age: 45
End: 2025-03-28
Payer: COMMERCIAL

## 2025-03-28 NOTE — TELEPHONE ENCOUNTER
Caller: Taj, April    Relationship: Self    Best call back number: [phone]     What was the call regarding: PATIENT STATES A PRIOR AUTHORIZATION IS NEEDED FOR SYNTHROID. PATIENT STATES SHE ONLY HAS 3 DAYS WORTH OF THE MEDICATION LEFT.

## 2025-04-07 DIAGNOSIS — E03.9 ACQUIRED HYPOTHYROIDISM: Chronic | ICD-10-CM

## 2025-04-07 RX ORDER — LEVOTHYROXINE SODIUM 137 MCG
TABLET ORAL
Qty: 90 TABLET | Refills: 0 | Status: SHIPPED | OUTPATIENT
Start: 2025-04-07

## 2025-04-08 ENCOUNTER — OFFICE VISIT (OUTPATIENT)
Dept: OBSTETRICS AND GYNECOLOGY | Age: 45
End: 2025-04-08
Payer: COMMERCIAL

## 2025-04-08 VITALS
HEIGHT: 63 IN | SYSTOLIC BLOOD PRESSURE: 138 MMHG | DIASTOLIC BLOOD PRESSURE: 79 MMHG | BODY MASS INDEX: 36.86 KG/M2 | WEIGHT: 208 LBS | HEART RATE: 76 BPM

## 2025-04-08 DIAGNOSIS — N95.1 MENOPAUSAL SYMPTOMS: Primary | ICD-10-CM

## 2025-04-08 PROBLEM — Z01.419 WELL WOMAN EXAM: Status: RESOLVED | Noted: 2025-03-10 | Resolved: 2025-04-08

## 2025-04-08 PROCEDURE — 3078F DIAST BP <80 MM HG: CPT | Performed by: OBSTETRICS & GYNECOLOGY

## 2025-04-08 PROCEDURE — 1160F RVW MEDS BY RX/DR IN RCRD: CPT | Performed by: OBSTETRICS & GYNECOLOGY

## 2025-04-08 PROCEDURE — 99213 OFFICE O/P EST LOW 20 MIN: CPT | Performed by: OBSTETRICS & GYNECOLOGY

## 2025-04-08 PROCEDURE — 3075F SYST BP GE 130 - 139MM HG: CPT | Performed by: OBSTETRICS & GYNECOLOGY

## 2025-04-08 PROCEDURE — 1159F MED LIST DOCD IN RCRD: CPT | Performed by: OBSTETRICS & GYNECOLOGY

## 2025-04-08 RX ORDER — ESTRADIOL/NORETHINDRONE ACETATE TRANSDERMAL SYSTEM .05; .25 MG/D; MG/D
1 PATCH, EXTENDED RELEASE TRANSDERMAL 2 TIMES WEEKLY
Qty: 8 PATCH | Refills: 2 | Status: SHIPPED | OUTPATIENT
Start: 2025-04-10 | End: 2025-07-09

## 2025-04-08 NOTE — ASSESSMENT & PLAN NOTE
Terms are greatly improved with the CombiPatch.  Given the patient feels she could increase the dose for further improvement will increase to 0.05/0.25 mg/day.  She will apply 1 patch twice per week.  The risk, benefits, alternatives were reviewed including the risk of VTE.  We discussed that vaginal dryness may improve with time however if it does not we could add a local vaginal estrogen.  Will follow-up in 3 months and reevaluate.

## 2025-04-08 NOTE — PROGRESS NOTES
"[facility]  Gynecological Visit    CC: Follow-up meds    Subjective:   44 y.o. who presents in follow-up of medical treatment of menopausal symptoms.  She is very happy with the CombiPatch.  She feels like her symptoms are much better.  There was almost immediate improvement in the most symptoms.  She still has occasional mild symptoms.  She still has some of the brain fogginess and vaginal dryness.  She does think she could go up on dose for further improvement.    History:   Past medical history, medications, allergies, surgical history, social history, and obstetrical history all reviewed and updated.    Last Completed Pap Smear            Upcoming       PAP SMEAR (Every 3 Years) Next due on 3/10/2028      03/10/2025  IGP,rfx Aptima HPV All Pth    2024  IGP,rfx Aptima HPV All Pth    2023  IGP,rfx Aptima HPV All Pth    2021  SCANNED - PAP SMEAR                          Objective:/79   Pulse 76   Ht 160 cm (62.99\")   Wt 94.3 kg (208 lb)   BMI 36.85 kg/m²     Physical Exam  Vitals and nursing note reviewed.   Constitutional:       General: She is not in acute distress.     Appearance: Normal appearance. She is not ill-appearing.   Neurological:      Mental Status: She is alert and oriented to person, place, and time.   Psychiatric:         Mood and Affect: Mood normal.         Behavior: Behavior normal.         Thought Content: Thought content normal.         Judgment: Judgment normal.       Assessment and Plan:  Diagnoses and all orders for this visit:    1. Menopausal symptoms (Primary)  Assessment & Plan:  Terms are greatly improved with the CombiPatch.  Given the patient feels she could increase the dose for further improvement will increase to 0.05/0.25 mg/day.  She will apply 1 patch twice per week.  The risk, benefits, alternatives were reviewed including the risk of VTE.  We discussed that vaginal dryness may improve with time however if it does not we " could add a local vaginal estrogen.  Will follow-up in 3 months and reevaluate.    Orders:  -     estradiol-norethindrone (CombiPatch) 0.05-0.25 MG/DAY; Place 1 patch on the skin as directed by provider 2 (Two) Times a Week for 90 days.  Dispense: 8 patch; Refill: 2        Follow Up:  Return in about 3 months (around 7/8/2025) for Recheck.    Praful Topete MD  04/08/2025

## 2025-04-30 ENCOUNTER — OFFICE VISIT (OUTPATIENT)
Dept: FAMILY MEDICINE CLINIC | Facility: CLINIC | Age: 45
End: 2025-04-30
Payer: COMMERCIAL

## 2025-04-30 VITALS
SYSTOLIC BLOOD PRESSURE: 142 MMHG | HEART RATE: 82 BPM | OXYGEN SATURATION: 99 % | BODY MASS INDEX: 39.56 KG/M2 | TEMPERATURE: 98.2 F | WEIGHT: 215 LBS | HEIGHT: 62 IN | DIASTOLIC BLOOD PRESSURE: 81 MMHG

## 2025-04-30 DIAGNOSIS — R20.2 TINGLING OF BOTH FEET: ICD-10-CM

## 2025-04-30 DIAGNOSIS — R06.02 SHORTNESS OF BREATH: ICD-10-CM

## 2025-04-30 DIAGNOSIS — G93.32 CHRONIC FATIGUE SYNDROME: Chronic | ICD-10-CM

## 2025-04-30 DIAGNOSIS — E61.1 IRON DEFICIENCY: ICD-10-CM

## 2025-04-30 DIAGNOSIS — E66.01 CLASS 2 SEVERE OBESITY DUE TO EXCESS CALORIES WITH SERIOUS COMORBIDITY AND BODY MASS INDEX (BMI) OF 37.0 TO 37.9 IN ADULT: Primary | Chronic | ICD-10-CM

## 2025-04-30 DIAGNOSIS — E03.9 HYPOTHYROIDISM, UNSPECIFIED TYPE: ICD-10-CM

## 2025-04-30 DIAGNOSIS — R20.0 NUMBNESS AND TINGLING IN BOTH HANDS: ICD-10-CM

## 2025-04-30 DIAGNOSIS — E66.812 CLASS 2 SEVERE OBESITY DUE TO EXCESS CALORIES WITH SERIOUS COMORBIDITY AND BODY MASS INDEX (BMI) OF 37.0 TO 37.9 IN ADULT: Primary | Chronic | ICD-10-CM

## 2025-04-30 DIAGNOSIS — R20.2 NUMBNESS AND TINGLING IN BOTH HANDS: ICD-10-CM

## 2025-04-30 DIAGNOSIS — I10 PRIMARY HYPERTENSION: Chronic | ICD-10-CM

## 2025-04-30 PROCEDURE — 99214 OFFICE O/P EST MOD 30 MIN: CPT

## 2025-04-30 PROCEDURE — 3079F DIAST BP 80-89 MM HG: CPT

## 2025-04-30 PROCEDURE — 1126F AMNT PAIN NOTED NONE PRSNT: CPT

## 2025-04-30 PROCEDURE — 3077F SYST BP >= 140 MM HG: CPT
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
## 2025-05-01 ENCOUNTER — LAB (OUTPATIENT)
Dept: LAB | Facility: HOSPITAL | Age: 45
End: 2025-05-01
Payer: COMMERCIAL

## 2025-05-01 DIAGNOSIS — E66.01 CLASS 2 SEVERE OBESITY DUE TO EXCESS CALORIES WITH SERIOUS COMORBIDITY AND BODY MASS INDEX (BMI) OF 37.0 TO 37.9 IN ADULT: Chronic | ICD-10-CM

## 2025-05-01 DIAGNOSIS — R20.0 NUMBNESS AND TINGLING IN BOTH HANDS: ICD-10-CM

## 2025-05-01 DIAGNOSIS — R20.2 NUMBNESS AND TINGLING IN BOTH HANDS: ICD-10-CM

## 2025-05-01 DIAGNOSIS — E61.1 IRON DEFICIENCY: ICD-10-CM

## 2025-05-01 DIAGNOSIS — E03.9 HYPOTHYROIDISM, UNSPECIFIED TYPE: ICD-10-CM

## 2025-05-01 DIAGNOSIS — E03.9 ACQUIRED HYPOTHYROIDISM: Chronic | ICD-10-CM

## 2025-05-01 DIAGNOSIS — R20.2 TINGLING OF BOTH FEET: ICD-10-CM

## 2025-05-01 DIAGNOSIS — E66.812 CLASS 2 SEVERE OBESITY DUE TO EXCESS CALORIES WITH SERIOUS COMORBIDITY AND BODY MASS INDEX (BMI) OF 37.0 TO 37.9 IN ADULT: Chronic | ICD-10-CM

## 2025-05-01 DIAGNOSIS — G93.32 CHRONIC FATIGUE SYNDROME: ICD-10-CM

## 2025-05-01 LAB
25(OH)D3 SERPL-MCNC: 21.7 NG/ML (ref 30–100)
ALBUMIN SERPL-MCNC: 4.4 G/DL (ref 3.5–5.2)
ALBUMIN/GLOB SERPL: 1.5 G/DL
ALP SERPL-CCNC: 64 U/L (ref 39–117)
ALT SERPL W P-5'-P-CCNC: 21 U/L (ref 1–33)
ANION GAP SERPL CALCULATED.3IONS-SCNC: 10.8 MMOL/L (ref 5–15)
AST SERPL-CCNC: 20 U/L (ref 1–32)
BASOPHILS # BLD AUTO: 0.06 10*3/MM3 (ref 0–0.2)
BASOPHILS NFR BLD AUTO: 0.8 % (ref 0–1.5)
BILIRUB SERPL-MCNC: 0.3 MG/DL (ref 0–1.2)
BUN SERPL-MCNC: 12 MG/DL (ref 6–20)
BUN/CREAT SERPL: 12.4 (ref 7–25)
CALCIUM SPEC-SCNC: 9.2 MG/DL (ref 8.6–10.5)
CHLORIDE SERPL-SCNC: 103 MMOL/L (ref 98–107)
CO2 SERPL-SCNC: 24.2 MMOL/L (ref 22–29)
CREAT SERPL-MCNC: 0.97 MG/DL (ref 0.57–1)
DEPRECATED RDW RBC AUTO: 40.1 FL (ref 37–54)
EGFRCR SERPLBLD CKD-EPI 2021: 74.5 ML/MIN/1.73
EOSINOPHIL # BLD AUTO: 0.38 10*3/MM3 (ref 0–0.4)
EOSINOPHIL NFR BLD AUTO: 4.9 % (ref 0.3–6.2)
ERYTHROCYTE [DISTWIDTH] IN BLOOD BY AUTOMATED COUNT: 12.5 % (ref 12.3–15.4)
FOLATE SERPL-MCNC: 14.9 NG/ML (ref 4.78–24.2)
GLOBULIN UR ELPH-MCNC: 2.9 GM/DL
GLUCOSE SERPL-MCNC: 98 MG/DL (ref 65–99)
HBA1C MFR BLD: 5.3 % (ref 4.8–5.6)
HCT VFR BLD AUTO: 42.1 % (ref 34–46.6)
HGB BLD-MCNC: 14.3 G/DL (ref 12–15.9)
IMM GRANULOCYTES # BLD AUTO: 0.03 10*3/MM3 (ref 0–0.05)
IMM GRANULOCYTES NFR BLD AUTO: 0.4 % (ref 0–0.5)
IRON 24H UR-MRATE: 71 MCG/DL (ref 37–145)
IRON SATN MFR SERPL: 19 % (ref 20–50)
LYMPHOCYTES # BLD AUTO: 2.53 10*3/MM3 (ref 0.7–3.1)
LYMPHOCYTES NFR BLD AUTO: 32.8 % (ref 19.6–45.3)
MCH RBC QN AUTO: 29.9 PG (ref 26.6–33)
MCHC RBC AUTO-ENTMCNC: 34 G/DL (ref 31.5–35.7)
MCV RBC AUTO: 88.1 FL (ref 79–97)
MONOCYTES # BLD AUTO: 0.57 10*3/MM3 (ref 0.1–0.9)
MONOCYTES NFR BLD AUTO: 7.4 % (ref 5–12)
NEUTROPHILS NFR BLD AUTO: 4.15 10*3/MM3 (ref 1.7–7)
NEUTROPHILS NFR BLD AUTO: 53.7 % (ref 42.7–76)
NRBC BLD AUTO-RTO: 0 /100 WBC (ref 0–0.2)
PLATELET # BLD AUTO: 359 10*3/MM3 (ref 140–450)
PMV BLD AUTO: 10.2 FL (ref 6–12)
POTASSIUM SERPL-SCNC: 4.7 MMOL/L (ref 3.5–5.2)
PROT SERPL-MCNC: 7.3 G/DL (ref 6–8.5)
RBC # BLD AUTO: 4.78 10*6/MM3 (ref 3.77–5.28)
SODIUM SERPL-SCNC: 138 MMOL/L (ref 136–145)
T4 FREE SERPL-MCNC: 1.1 NG/DL (ref 0.92–1.68)
TIBC SERPL-MCNC: 368 MCG/DL (ref 298–536)
TRANSFERRIN SERPL-MCNC: 247 MG/DL (ref 200–360)
TSH SERPL DL<=0.05 MIU/L-ACNC: 4.82 UIU/ML (ref 0.27–4.2)
VIT B12 BLD-MCNC: 419 PG/ML (ref 211–946)
WBC NRBC COR # BLD AUTO: 7.72 10*3/MM3 (ref 3.4–10.8)

## 2025-05-01 PROCEDURE — 84439 ASSAY OF FREE THYROXINE: CPT

## 2025-05-01 PROCEDURE — 83036 HEMOGLOBIN GLYCOSYLATED A1C: CPT

## 2025-05-01 PROCEDURE — 85025 COMPLETE CBC W/AUTO DIFF WBC: CPT

## 2025-05-01 PROCEDURE — 80053 COMPREHEN METABOLIC PANEL: CPT

## 2025-05-01 PROCEDURE — 82746 ASSAY OF FOLIC ACID SERUM: CPT

## 2025-05-01 PROCEDURE — 83540 ASSAY OF IRON: CPT

## 2025-05-01 PROCEDURE — 82306 VITAMIN D 25 HYDROXY: CPT

## 2025-05-01 PROCEDURE — 84443 ASSAY THYROID STIM HORMONE: CPT

## 2025-05-01 PROCEDURE — 84466 ASSAY OF TRANSFERRIN: CPT

## 2025-05-01 PROCEDURE — 82607 VITAMIN B-12: CPT

## 2025-05-01 PROCEDURE — 36415 COLL VENOUS BLD VENIPUNCTURE: CPT

## 2025-05-02 DIAGNOSIS — E55.9 VITAMIN D DEFICIENCY: Primary | ICD-10-CM

## 2025-05-13 ENCOUNTER — TRANSCRIBE ORDERS (OUTPATIENT)
Dept: ADMINISTRATIVE | Facility: HOSPITAL | Age: 45
End: 2025-05-13
Payer: COMMERCIAL

## 2025-05-13 DIAGNOSIS — H90.0 CONDUCTIVE HEARING LOSS, BILATERAL: Primary | ICD-10-CM

## 2025-05-19 ENCOUNTER — HOSPITAL ENCOUNTER (OUTPATIENT)
Dept: CT IMAGING | Facility: HOSPITAL | Age: 45
Discharge: HOME | End: 2025-05-19
Admitting: OTOLARYNGOLOGY
Payer: COMMERCIAL

## 2025-05-19 DIAGNOSIS — H90.0 CONDUCTIVE HEARING LOSS, BILATERAL: ICD-10-CM

## 2025-05-19 PROCEDURE — 70480 CT ORBIT/EAR/FOSSA W/O DYE: CPT

## 2025-06-09 ENCOUNTER — TELEPHONE (OUTPATIENT)
Dept: FAMILY MEDICINE CLINIC | Facility: CLINIC | Age: 45
End: 2025-06-09
Payer: COMMERCIAL

## 2025-06-09 DIAGNOSIS — E55.9 VITAMIN D DEFICIENCY: ICD-10-CM

## 2025-06-09 DIAGNOSIS — E78.2 MIXED HYPERLIPIDEMIA: Chronic | ICD-10-CM

## 2025-06-09 DIAGNOSIS — E03.9 ACQUIRED HYPOTHYROIDISM: Primary | Chronic | ICD-10-CM

## 2025-06-09 DIAGNOSIS — Z00.00 ANNUAL PHYSICAL EXAM: ICD-10-CM

## 2025-06-09 NOTE — TELEPHONE ENCOUNTER
Caller: Taj, April    Relationship: Self    Best call back number:   Telephone Information:   Mobile [phone]         Requested Prescriptions:   Requested Prescriptions     Pending Prescriptions Disp Refills    cholecalciferol (VITAMIN D3) 250 MCG (97780 UT) capsule 30 capsule 11     Sig: Take 1 capsule by mouth Daily.        Pharmacy where request should be sent: [address] [phone]  - [phone] [facility]     Last office visit with prescribing clinician: 2/24/2025   Last telemedicine visit with prescribing clinician: Visit date not found   Next office visit with prescribing clinician: 8/25/2025     Additional details provided by patient:        THE PATIENT SAID THERE IS NO REFILLS.  SHE IS OUT OF THIS MEDICATION     Does the patient have less than a 3 day supply:  [x] Yes  [] No    Would you like a call back once the refill request has been completed: [] Yes [x] No    If the office needs to give you a call back, can they leave a voicemail: [] Yes [x] No    Mir Conde Rep   06/09/25 11:41 EDT

## 2025-06-09 NOTE — TELEPHONE ENCOUNTER
Caller: Taj, April    Relationship: Self    Best call back number:   Telephone Information:   Mobile [phone]         What orders are you requesting (i.e. lab or imaging): LABS    In what timeframe would the patient need to come in: ASAP    Where will you receive your lab/imaging services: N/A    Additional notes:       THE PATIENT SAID SHE IS NOT SURE IF LABS ARE NEEDED BUT IF SO SHE IS REQUESTING THIS TO BE SENT. SHE SAID IT COULD POSSIBLY BE NEEDED FOR THE VITAMIN D 3 BEFORE SHE RECEIVES HER REFILLS

## 2025-06-09 NOTE — TELEPHONE ENCOUNTER
Caller: Taj, April    Relationship: Self    Best call back number:   Telephone Information:   Mobile [phone]         What medication are you requesting: SYNTHROID        If a prescription is needed, what is your preferred pharmacy and phone number: [place], [place] - [address] [facility] [phone] [facility] [phone] [facility]     Additional notes:    THE PATIENT IS REQUESTING THE BRAND NAME FOR THE LEVOTHYROXINE. SHE SAID SHE DOES BETTER ON THE NAME BRAND

## 2025-06-13 RX ORDER — ESTRADIOL/NORETHINDRONE ACETATE TRANSDERMAL SYSTEM .05; .14 MG/D; MG/D
1 PATCH, EXTENDED RELEASE TRANSDERMAL 2 TIMES WEEKLY
Qty: 24 PATCH | Refills: 3 | Status: SHIPPED | OUTPATIENT
Start: 2025-06-16

## 2025-08-08 ENCOUNTER — OFFICE VISIT (OUTPATIENT)
Dept: OBSTETRICS AND GYNECOLOGY | Age: 45
End: 2025-08-08
Payer: COMMERCIAL

## 2025-08-08 VITALS
HEIGHT: 62 IN | BODY MASS INDEX: 38.83 KG/M2 | HEART RATE: 93 BPM | DIASTOLIC BLOOD PRESSURE: 81 MMHG | SYSTOLIC BLOOD PRESSURE: 132 MMHG | WEIGHT: 211 LBS

## 2025-08-08 DIAGNOSIS — N95.1 MENOPAUSAL SYMPTOMS: Primary | ICD-10-CM

## 2025-08-08 PROBLEM — J30.2 SEASONAL ALLERGIES: Chronic | Status: RESOLVED | Noted: 2022-03-01 | Resolved: 2025-08-08

## 2025-08-08 PROBLEM — Z81.0 FAMILY HISTORY OF INTELLECTUAL DISABILITY: Chronic | Status: RESOLVED | Noted: 2022-03-01 | Resolved: 2025-08-08

## 2025-08-08 PROBLEM — Z82.79 FAMILY HISTORY OF NEURAL TUBE DEFECT: Chronic | Status: RESOLVED | Noted: 2022-03-01 | Resolved: 2025-08-08

## 2025-08-08 PROCEDURE — 1159F MED LIST DOCD IN RCRD: CPT | Performed by: OBSTETRICS & GYNECOLOGY

## 2025-08-08 PROCEDURE — 3075F SYST BP GE 130 - 139MM HG: CPT | Performed by: OBSTETRICS & GYNECOLOGY

## 2025-08-08 PROCEDURE — 3079F DIAST BP 80-89 MM HG: CPT | Performed by: OBSTETRICS & GYNECOLOGY

## 2025-08-08 PROCEDURE — 1160F RVW MEDS BY RX/DR IN RCRD: CPT | Performed by: OBSTETRICS & GYNECOLOGY

## 2025-08-08 PROCEDURE — 99213 OFFICE O/P EST LOW 20 MIN: CPT | Performed by: OBSTETRICS & GYNECOLOGY

## 2025-08-08 RX ORDER — ESTRADIOL/NORETHINDRONE ACETATE TRANSDERMAL SYSTEM .05; .14 MG/D; MG/D
1 PATCH, EXTENDED RELEASE TRANSDERMAL 2 TIMES WEEKLY
Qty: 24 PATCH | Refills: 3 | Status: SHIPPED | OUTPATIENT
Start: 2025-08-11

## 2025-08-15 ENCOUNTER — TELEPHONE (OUTPATIENT)
Dept: FAMILY MEDICINE CLINIC | Facility: CLINIC | Age: 45
End: 2025-08-15
Payer: COMMERCIAL

## 2025-08-18 ENCOUNTER — OFFICE VISIT (OUTPATIENT)
Dept: SLEEP MEDICINE | Facility: HOSPITAL | Age: 45
End: 2025-08-18
Payer: COMMERCIAL

## 2025-08-18 VITALS
HEIGHT: 62 IN | SYSTOLIC BLOOD PRESSURE: 121 MMHG | HEART RATE: 69 BPM | BODY MASS INDEX: 38.64 KG/M2 | WEIGHT: 210 LBS | DIASTOLIC BLOOD PRESSURE: 68 MMHG | OXYGEN SATURATION: 98 %

## 2025-08-18 DIAGNOSIS — E66.812 OBESITY, CLASS II, BMI 35-39.9: ICD-10-CM

## 2025-08-18 DIAGNOSIS — G47.19 EXCESSIVE DAYTIME SLEEPINESS: ICD-10-CM

## 2025-08-18 DIAGNOSIS — G25.81 RESTLESS LEGS SYNDROME (RLS): Primary | ICD-10-CM

## 2025-08-18 PROCEDURE — 3074F SYST BP LT 130 MM HG: CPT | Performed by: STUDENT IN AN ORGANIZED HEALTH CARE EDUCATION/TRAINING PROGRAM

## 2025-08-18 PROCEDURE — G0463 HOSPITAL OUTPT CLINIC VISIT: HCPCS | Performed by: STUDENT IN AN ORGANIZED HEALTH CARE EDUCATION/TRAINING PROGRAM

## 2025-08-18 PROCEDURE — 1160F RVW MEDS BY RX/DR IN RCRD: CPT | Performed by: STUDENT IN AN ORGANIZED HEALTH CARE EDUCATION/TRAINING PROGRAM

## 2025-08-18 PROCEDURE — 99204 OFFICE O/P NEW MOD 45 MIN: CPT | Performed by: STUDENT IN AN ORGANIZED HEALTH CARE EDUCATION/TRAINING PROGRAM

## 2025-08-18 PROCEDURE — 1159F MED LIST DOCD IN RCRD: CPT | Performed by: STUDENT IN AN ORGANIZED HEALTH CARE EDUCATION/TRAINING PROGRAM

## 2025-08-18 PROCEDURE — 3078F DIAST BP <80 MM HG: CPT | Performed by: STUDENT IN AN ORGANIZED HEALTH CARE EDUCATION/TRAINING PROGRAM

## 2025-08-19 ENCOUNTER — HOSPITAL ENCOUNTER (OUTPATIENT)
Dept: SLEEP MEDICINE | Facility: HOSPITAL | Age: 45
Discharge: HOME | End: 2025-08-19
Admitting: STUDENT IN AN ORGANIZED HEALTH CARE EDUCATION/TRAINING PROGRAM
Payer: COMMERCIAL

## 2025-08-19 DIAGNOSIS — G47.19 EXCESSIVE DAYTIME SLEEPINESS: ICD-10-CM

## 2025-08-19 PROCEDURE — G0399 HOME SLEEP TEST/TYPE 3 PORTA: HCPCS

## 2025-08-22 ENCOUNTER — LAB (OUTPATIENT)
Dept: LAB | Facility: HOSPITAL | Age: 45
End: 2025-08-22
Payer: COMMERCIAL

## 2025-08-25 ENCOUNTER — RESULTS FOLLOW-UP (OUTPATIENT)
Dept: SLEEP MEDICINE | Facility: HOSPITAL | Age: 45
End: 2025-08-25
Payer: COMMERCIAL

## 2025-08-25 ENCOUNTER — OFFICE VISIT (OUTPATIENT)
Dept: FAMILY MEDICINE CLINIC | Facility: CLINIC | Age: 45
End: 2025-08-25
Payer: COMMERCIAL

## 2025-08-25 VITALS
WEIGHT: 212.2 LBS | HEIGHT: 62 IN | OXYGEN SATURATION: 100 % | BODY MASS INDEX: 39.05 KG/M2 | RESPIRATION RATE: 17 BRPM | DIASTOLIC BLOOD PRESSURE: 85 MMHG | HEART RATE: 75 BPM | SYSTOLIC BLOOD PRESSURE: 123 MMHG | TEMPERATURE: 97.9 F

## 2025-08-25 DIAGNOSIS — N64.82 MICROMASTIA: ICD-10-CM

## 2025-08-25 DIAGNOSIS — E66.812 CLASS 2 SEVERE OBESITY DUE TO EXCESS CALORIES WITH SERIOUS COMORBIDITY AND BODY MASS INDEX (BMI) OF 38.0 TO 38.9 IN ADULT: ICD-10-CM

## 2025-08-25 DIAGNOSIS — M54.9 MID BACK PAIN: ICD-10-CM

## 2025-08-25 DIAGNOSIS — N64.4 MASTALGIA: ICD-10-CM

## 2025-08-25 DIAGNOSIS — M25.512 CHRONIC PAIN OF BOTH SHOULDERS: ICD-10-CM

## 2025-08-25 DIAGNOSIS — Z00.01 ENCOUNTER FOR GENERAL ADULT MEDICAL EXAMINATION WITH ABNORMAL FINDINGS: Primary | ICD-10-CM

## 2025-08-25 DIAGNOSIS — Z00.00 ANNUAL PHYSICAL EXAM: ICD-10-CM

## 2025-08-25 DIAGNOSIS — I10 PRIMARY HYPERTENSION: Chronic | ICD-10-CM

## 2025-08-25 DIAGNOSIS — E78.2 MIXED HYPERLIPIDEMIA: Chronic | ICD-10-CM

## 2025-08-25 DIAGNOSIS — M25.511 CHRONIC PAIN OF BOTH SHOULDERS: ICD-10-CM

## 2025-08-25 DIAGNOSIS — E66.01 CLASS 2 SEVERE OBESITY DUE TO EXCESS CALORIES WITH SERIOUS COMORBIDITY AND BODY MASS INDEX (BMI) OF 38.0 TO 38.9 IN ADULT: ICD-10-CM

## 2025-08-25 DIAGNOSIS — G89.29 CHRONIC PAIN OF BOTH SHOULDERS: ICD-10-CM

## 2025-08-25 DIAGNOSIS — Z12.31 ENCOUNTER FOR SCREENING MAMMOGRAM FOR MALIGNANT NEOPLASM OF BREAST: ICD-10-CM

## 2025-08-25 DIAGNOSIS — E03.9 ACQUIRED HYPOTHYROIDISM: Chronic | ICD-10-CM

## 2025-08-25 PROBLEM — M25.519 SHOULDER PAIN: Chronic | Status: ACTIVE | Noted: 2025-08-25

## 2025-08-25 PROBLEM — M25.519 SHOULDER PAIN: Status: ACTIVE | Noted: 2025-08-25

## 2025-08-25 PROCEDURE — 81001 URINALYSIS AUTO W/SCOPE: CPT | Performed by: FAMILY MEDICINE

## 2025-08-25 PROCEDURE — 3074F SYST BP LT 130 MM HG: CPT | Performed by: FAMILY MEDICINE

## 2025-08-25 PROCEDURE — 3079F DIAST BP 80-89 MM HG: CPT | Performed by: FAMILY MEDICINE

## 2025-08-25 PROCEDURE — 1160F RVW MEDS BY RX/DR IN RCRD: CPT | Performed by: FAMILY MEDICINE

## 2025-08-25 PROCEDURE — 99396 PREV VISIT EST AGE 40-64: CPT | Performed by: FAMILY MEDICINE

## 2025-08-25 PROCEDURE — 1126F AMNT PAIN NOTED NONE PRSNT: CPT | Performed by: FAMILY MEDICINE

## 2025-08-25 PROCEDURE — 1159F MED LIST DOCD IN RCRD: CPT | Performed by: FAMILY MEDICINE

## 2025-08-25 RX ORDER — FLUTICASONE PROPIONATE 50 MCG
1 SPRAY, SUSPENSION (ML) NASAL DAILY
COMMUNITY
Start: 2025-08-07

## 2025-08-25 RX ORDER — LEVOTHYROXINE SODIUM 137 MCG
137 TABLET ORAL
Qty: 90 TABLET | Refills: 1 | Status: SHIPPED | OUTPATIENT
Start: 2025-08-25

## 2025-08-25 RX ORDER — GUAIFENESIN 600 MG/1
600 TABLET, EXTENDED RELEASE ORAL 2 TIMES DAILY
COMMUNITY
Start: 2025-08-08

## 2025-08-26 LAB
BACTERIA UR QL AUTO: ABNORMAL /HPF
BILIRUB UR QL STRIP: NEGATIVE
CLARITY UR: CLEAR
COLOR UR: YELLOW
GLUCOSE UR STRIP-MCNC: NEGATIVE MG/DL
HGB UR QL STRIP.AUTO: NEGATIVE
HYALINE CASTS UR QL AUTO: ABNORMAL /LPF
KETONES UR QL STRIP: NEGATIVE
LEUKOCYTE ESTERASE UR QL STRIP.AUTO: NEGATIVE
NITRITE UR QL STRIP: NEGATIVE
PH UR STRIP.AUTO: 5.5 [PH] (ref 5–8)
PROT UR QL STRIP: NEGATIVE
RBC # UR STRIP: ABNORMAL /HPF
REF LAB TEST METHOD: ABNORMAL
SP GR UR STRIP: 1.02 (ref 1–1.03)
SQUAMOUS #/AREA URNS HPF: ABNORMAL /HPF
UROBILINOGEN UR QL STRIP: NORMAL
WBC # UR STRIP: ABNORMAL /HPF

## (undated) DEVICE — 1000ML,PRESSURE INFUSER W/STOPCOCK: Brand: MEDLINE

## (undated) DEVICE — SUT VIC 3/0 SH 27IN J416H

## (undated) DEVICE — PK MAJ CUST HAR

## (undated) DEVICE — SUT MNCRYL 4/0 PS2 18 IN

## (undated) DEVICE — GLV SURG BIOGEL LTX PF 7 1/2

## (undated) DEVICE — PROB ABL ENDOMTRL NOVASURE/G4 W/SURESND

## (undated) DEVICE — CATH URETH ALLPURP STR RUBBR 16F RD

## (undated) DEVICE — TRY PREP SCRB VAG PVP

## (undated) DEVICE — SLV SCD LEG COMFORT KENDALLSCD MD REPROC

## (undated) DEVICE — ELECTRD BLD EDGE COAT 3IN

## (undated) DEVICE — ADHS SKIN PREMIERPRO EXOFIN TOPICAL HI/VISC .5ML

## (undated) DEVICE — GOWN SURG IMPERV  XLG

## (undated) DEVICE — GLV SURG BIOGEL LTX PF 7

## (undated) DEVICE — SOL IRR NACL 0.9PCT 500ML

## (undated) DEVICE — SOL NACL 0.9PCT 1000ML

## (undated) DEVICE — LITHOTOMY-YELLOW FINS: Brand: MEDLINE INDUSTRIES, INC.

## (undated) DEVICE — PENCL E/S SMOKEEVAC W/TELESCP CANN

## (undated) DEVICE — BLD SCLPL RIBBACK C/SS SZ15

## (undated) DEVICE — GOWN,NON-REINFORCED,SIRUS,SET IN SLV,XXL: Brand: MEDLINE